# Patient Record
Sex: MALE | Race: WHITE | NOT HISPANIC OR LATINO | Employment: FULL TIME | URBAN - METROPOLITAN AREA
[De-identification: names, ages, dates, MRNs, and addresses within clinical notes are randomized per-mention and may not be internally consistent; named-entity substitution may affect disease eponyms.]

---

## 2019-01-07 ENCOUNTER — HOSPITAL ENCOUNTER (OUTPATIENT)
Dept: GENERAL RADIOLOGY | Facility: HOSPITAL | Age: 29
Discharge: HOME | End: 2019-01-07
Attending: NURSE PRACTITIONER

## 2019-01-16 ENCOUNTER — HOSPITAL ENCOUNTER (OUTPATIENT)
Dept: URGENT CARE | Facility: CLINIC | Age: 29
Discharge: HOME | End: 2019-01-16
Attending: PHYSICIAN ASSISTANT

## 2019-01-19 LAB — BACTERIA SPEC AEROBE CULT: NORMAL

## 2019-05-12 ENCOUNTER — HOSPITAL ENCOUNTER (OUTPATIENT)
Dept: URGENT CARE | Facility: CLINIC | Age: 29
Discharge: HOME | End: 2019-05-12
Attending: FAMILY MEDICINE

## 2019-05-14 LAB — BACTERIA SPEC AEROBE CULT: NORMAL

## 2019-10-24 ENCOUNTER — HOSPITAL ENCOUNTER (OUTPATIENT)
Dept: URGENT CARE | Facility: CLINIC | Age: 29
Discharge: HOME | End: 2019-10-24
Attending: FAMILY MEDICINE

## 2019-10-25 LAB
C TRACH RRNA CVX QL NAA+PROBE: NOT DETECTED
N GONORRHOEA DNA SPEC QL NAA+PROBE: NOT DETECTED

## 2020-01-28 ENCOUNTER — CONVERSION ENCOUNTER (OUTPATIENT)
Dept: GASTROENTEROLOGY | Facility: CLINIC | Age: 30
End: 2020-01-28

## 2020-01-28 ENCOUNTER — OFFICE VISIT CONVERTED (OUTPATIENT)
Dept: GASTROENTEROLOGY | Facility: CLINIC | Age: 30
End: 2020-01-28
Attending: INTERNAL MEDICINE

## 2020-02-14 ENCOUNTER — HOSPITAL ENCOUNTER (OUTPATIENT)
Dept: GASTROENTEROLOGY | Facility: HOSPITAL | Age: 30
Setting detail: HOSPITAL OUTPATIENT SURGERY
Discharge: HOME | End: 2020-02-14
Attending: INTERNAL MEDICINE

## 2020-04-14 ENCOUNTER — TELEPHONE CONVERTED (OUTPATIENT)
Dept: GASTROENTEROLOGY | Facility: CLINIC | Age: 30
End: 2020-04-14
Attending: PHYSICIAN ASSISTANT

## 2020-05-12 ENCOUNTER — HOSPITAL ENCOUNTER (OUTPATIENT)
Dept: URGENT CARE | Facility: CLINIC | Age: 30
Discharge: HOME | End: 2020-05-12

## 2020-05-12 LAB
C TRACH RRNA CVX QL NAA+PROBE: NOT DETECTED
N GONORRHOEA DNA SPEC QL NAA+PROBE: NOT DETECTED

## 2020-09-21 ENCOUNTER — OFFICE VISIT CONVERTED (OUTPATIENT)
Dept: GASTROENTEROLOGY | Facility: CLINIC | Age: 30
End: 2020-09-21
Attending: NURSE PRACTITIONER

## 2020-11-12 ENCOUNTER — OFFICE VISIT CONVERTED (OUTPATIENT)
Dept: FAMILY MEDICINE CLINIC | Facility: CLINIC | Age: 30
End: 2020-11-12
Attending: PHYSICIAN ASSISTANT

## 2020-11-12 ENCOUNTER — CONVERSION ENCOUNTER (OUTPATIENT)
Dept: FAMILY MEDICINE CLINIC | Facility: CLINIC | Age: 30
End: 2020-11-12

## 2020-12-11 ENCOUNTER — HOSPITAL ENCOUNTER (OUTPATIENT)
Dept: URGENT CARE | Facility: CLINIC | Age: 30
Discharge: HOME | End: 2020-12-11
Attending: NURSE PRACTITIONER

## 2020-12-17 ENCOUNTER — OFFICE VISIT CONVERTED (OUTPATIENT)
Dept: FAMILY MEDICINE CLINIC | Facility: CLINIC | Age: 30
End: 2020-12-17
Attending: PHYSICIAN ASSISTANT

## 2020-12-18 ENCOUNTER — HOSPITAL ENCOUNTER (OUTPATIENT)
Dept: LAB | Facility: HOSPITAL | Age: 30
Discharge: HOME | End: 2020-12-18
Attending: PHYSICIAN ASSISTANT

## 2020-12-21 LAB — H PYLORI IGM SER-ACNC: <9 UNITS (ref 0–8.9)

## 2020-12-22 ENCOUNTER — HOSPITAL ENCOUNTER (OUTPATIENT)
Dept: GENERAL RADIOLOGY | Facility: HOSPITAL | Age: 30
Discharge: HOME | End: 2020-12-22
Attending: PHYSICIAN ASSISTANT

## 2020-12-23 ENCOUNTER — HOSPITAL ENCOUNTER (OUTPATIENT)
Dept: SURGERY | Facility: CLINIC | Age: 30
Discharge: HOME | End: 2020-12-23
Attending: NURSE PRACTITIONER

## 2020-12-23 ENCOUNTER — OFFICE VISIT CONVERTED (OUTPATIENT)
Dept: SURGERY | Facility: CLINIC | Age: 30
End: 2020-12-23
Attending: NURSE PRACTITIONER

## 2020-12-23 ENCOUNTER — CONVERSION ENCOUNTER (OUTPATIENT)
Dept: SURGERY | Facility: CLINIC | Age: 30
End: 2020-12-23

## 2020-12-23 LAB
APPEARANCE UR: CLEAR
BILIRUB UR QL: NEGATIVE
COLOR UR: YELLOW
CONV COLLECTION SOURCE (UA): NORMAL
CONV UROBILINOGEN IN URINE BY AUTOMATED TEST STRIP: 1 {EHRLICHU}/DL (ref 0.1–1)
GLUCOSE UR QL: NEGATIVE MG/DL
HGB UR QL STRIP: NEGATIVE
KETONES UR QL STRIP: NEGATIVE MG/DL
LEUKOCYTE ESTERASE UR QL STRIP: NEGATIVE
NITRITE UR QL STRIP: NEGATIVE
PH UR STRIP.AUTO: 6 [PH] (ref 5–8)
PROT UR QL: NEGATIVE MG/DL
SP GR UR: 1.03 (ref 1–1.03)

## 2020-12-25 LAB — BACTERIA UR CULT: NORMAL

## 2021-01-05 ENCOUNTER — OFFICE VISIT CONVERTED (OUTPATIENT)
Dept: GASTROENTEROLOGY | Facility: CLINIC | Age: 31
End: 2021-01-05
Attending: NURSE PRACTITIONER

## 2021-01-05 ENCOUNTER — CONVERSION ENCOUNTER (OUTPATIENT)
Dept: GASTROENTEROLOGY | Facility: CLINIC | Age: 31
End: 2021-01-05

## 2021-01-13 ENCOUNTER — OFFICE VISIT CONVERTED (OUTPATIENT)
Dept: SURGERY | Facility: CLINIC | Age: 31
End: 2021-01-13
Attending: NURSE PRACTITIONER

## 2021-01-21 ENCOUNTER — OFFICE VISIT CONVERTED (OUTPATIENT)
Dept: FAMILY MEDICINE CLINIC | Facility: CLINIC | Age: 31
End: 2021-01-21
Attending: PHYSICIAN ASSISTANT

## 2021-01-21 ENCOUNTER — HOSPITAL ENCOUNTER (OUTPATIENT)
Dept: LAB | Facility: HOSPITAL | Age: 31
Discharge: HOME | End: 2021-01-21
Attending: PHYSICIAN ASSISTANT

## 2021-01-21 LAB — ERYTHROCYTE [SEDIMENTATION RATE] IN BLOOD: 2 MM/H (ref 0–20)

## 2021-01-22 LAB
ASO AB SERPL-ACNC: 87 [IU]/ML (ref 0–200)
CONV RHEUMATOID FACTOR IGM: <10 [IU]/ML (ref 0–14)
CRP SERPL-MCNC: <3 MG/L (ref 0–5)
DSDNA AB SER-ACNC: NEGATIVE [IU]/ML
ENA AB SER IA-ACNC: NEGATIVE {RATIO}
URATE SERPL-MCNC: 5.4 MG/DL (ref 3.5–8.5)

## 2021-02-15 ENCOUNTER — OFFICE VISIT CONVERTED (OUTPATIENT)
Dept: UROLOGY | Facility: CLINIC | Age: 31
End: 2021-02-15
Attending: UROLOGY

## 2021-02-15 LAB
BILIRUB UR QL STRIP: NEGATIVE
COLOR UR: YELLOW
CONV CLARITY OF URINE: CLEAR
CONV PROTEIN IN URINE BY AUTOMATED TEST STRIP: NEGATIVE
CONV UROBILINOGEN IN URINE BY AUTOMATED TEST STRIP: NORMAL
GLUCOSE UR QL: NEGATIVE
HGB UR QL STRIP: NEGATIVE
KETONES UR QL STRIP: NEGATIVE
LEUKOCYTE ESTERASE UR QL STRIP: NEGATIVE
NITRITE UR QL STRIP: NEGATIVE
PH UR STRIP.AUTO: 5.5 [PH]
SP GR UR: NORMAL

## 2021-03-24 ENCOUNTER — OFFICE VISIT CONVERTED (OUTPATIENT)
Dept: FAMILY MEDICINE CLINIC | Facility: CLINIC | Age: 31
End: 2021-03-24
Attending: PHYSICIAN ASSISTANT

## 2021-05-04 ENCOUNTER — CONVERSION ENCOUNTER (OUTPATIENT)
Dept: GASTROENTEROLOGY | Facility: CLINIC | Age: 31
End: 2021-05-04

## 2021-05-10 NOTE — H&P
History and Physical      Patient Name: Ian Teixeira   Patient ID: [id number]   Sex: Male   YOB: 1990    Primary Care Provider: Monica TANG   Referring Provider: Monica TANG    Visit Date: December 23, 2020    Provider: DEZ [facility]   Location: [facility]   Location Address: [address] [place] [address]  [id number]   Location Phone: [phone]          Chief Complaint     Abdominal pain  Dysuria  painful ejaculation       History Of Present Illness     Patient presents today on referral from Monica Ramsay for dysuria and painful ejaculation. Reports dysuria has been present for 1 year. Patient has a history of multiple STD's. States his painful ejaculation is not with every orgasm but at least 2/month.  Patient reports that he was seen in [facility] for painful urination and painful ejaculation.  Patient reports they did some labs and said everything was good but stated he needed to follow-up with urology.  Urinalysis was negative at that visit.    Patient did have a CT scan on 12/22/2020 that was negative for  issues.  It did reveal some shaggy irregular colonic wall thickening involving the descending and transverse colon.  Patiently underwent a recent colonoscopy with Dr. Boothe and was diagnosed with IBS.    Today patient denies any urinary frequency or urgency.  Admits to dysuria.  Denies any nocturia.  Denies any abdominal or flank pain.  Denies any change in his urine color.  Denies any trouble voiding.  Admits to emptying his bladder well.  Denies any scrotum pain.           Past Medical History  Disease Name Date Onset Notes   Allergic rhinitis --  --    Anxiety --  --    GERD (gastroesophageal reflux disease) --  --          Past Surgical History  Procedure Name Date Notes   Colonoscopy 2020 --    EGD 2020 --    Spleen Removal --  --          Medication List  Name Date Started Instructions   buspirone 10 mg oral tablet  take 1  "tablet by oral route daily   Claritin 10 mg oral tablet 11/12/2020 take 1 tablet (10 mg) by oral route once daily for 30 days   omeprazole 40 mg oral capsule,delayed release(DR/EC) 09/21/2020 take 1 capsule (40 mg) by oral route once daily before a meal for 90 days   Singulair 10 mg oral tablet 11/12/2020 take 1 tablet (10 mg) by oral route once daily in the evening for 30 days         Allergy List  Allergen Name Date Reaction Notes   NO KNOWN DRUG ALLERGIES --  --  --        Allergies Reconciled  Family Medical History  Disease Name Relative/Age Notes   - No Family History of Colorectal Cancer  --          Social History  Finding Status Start/Stop Quantity Notes   Alcohol Current some day --/-- --  drinks weekly, 2 drinks per day, has been drinking for 6-10 years    --  --/-- --  --    Tobacco Former --/-- --  former smoker, smoked 6-10 years         Review of Systems  · Constitutional  o Denies  o : fatigue, fever, chills, night sweats  · Genitourinary  o Admits  o : dysuria  o Denies  o : urgency, frequency, nocturia, hematuria, oliguria, change in urine color, incontinence, urinary retention, difficulty voiding, urinary hesitancy, decreased stream, post-void dribbling, decreased libido, scrotal pain, penile discharge      Vitals  Date Time BP Position Site L\R Cuff Size HR RR TEMP (F) WT  HT  BMI kg/m2 BSA m2 O2 Sat FR L/min FiO2 HC       12/23/2020 10:05 AM       14  165lbs 0oz 5'  4\" 28.32 1.84                 Results  · In-Office Procedures  o Lab procedure  § Automated Dipstick Urinalysis (Surg Spec) WITHOUT Micro [facility] (03662)   § Color Ur: Yellow   § Clarity Ur: Clear   § Glucose Ur Ql Strip: Negative   § Bilirub Ur Ql Strip: Small   § Ketones Ur Ql Strip: Trace   § Sp Gr Ur Qn: greater than 1.030   § Hgb Ur Ql Strip: Negative   § pH Ur-LsCnc: 6.0   § Prot Ur Ql Strip: Negative   § Urobilinogen Ur Strip-mCnc: 1.0 E.U./dL   § Nitrite Ur Ql Strip: Negative   § WBC Est Ur Ql Strip: Negative "       Assessment  · Dysuria     788.1/R30.0  · Painful ejaculation     608.89/N53.12    Problems Reconciled  Plan  · Orders  o Urinalysis with Reflex Microscopy (43576) - 788.1/R30.0, 608.89/N53.12 - 12/23/2020  o Urine Culture (Clean Catch) [facility] (12051) - 788.1/R30.0, 608.89/N53.12 - 12/23/2020  · Medications  o Medications have been Reconciled  o Transition of Care or Provider Policy  · Instructions  o Follow-up with Dr. Michi Le in 1 month. If painful ejaculation persist make appt with Dr. Le before then.   o Will call with results of urine culture.   o Electronically Identified Patient Education Materials Provided Electronically  · Disposition  o Call or Return if symptoms worsen or persist.  · Referrals  o ID: [id number] Date: 12/21/2020 Type: Inbound  Specialty: Urology            Electronically Signed by: DEZ [facility] -Author on December 23, 2020 02:43:47 PM

## 2021-05-10 NOTE — H&P
History and Physical      Patient Name: Ian Teixeira   Patient ID: [id number]   Sex: Male   YOB: 1990    Primary Care Provider: Monica TANG   Referring Provider: Piper GARCES    Visit Date: November 12, 2020    Provider: CLYDE Cherry   Location: [facility]   Location Address: [address] [place] [address], [address]  [place], [place]  [id number]   Location Phone: [phone]          Chief Complaint  · New Patient-Establish Care       History Of Present Illness  Ian Teixeira is a 30 year old /White male who presents for evaluation and treatment of:      New patient to establish care, previous patient at [facility]     Anxiety: Patient is taking Buspirone 7.5 mg in am and 10mg at pm and states he feels like it works ok from time to time. He has been taking it a few months. He states it gives him a weird feeling when he first takes it. States that he worries about things of the future and things that he doen't have control of; sx about 5 years. /marriage issues that started in 2016. No SI/HI.    GERD: He currently takes Omeprazole with great results. He is followed by Dr. Boothe.    AR: He currently taking Loratidine but states it does not help. He started allergy injections about 1 year ago and is followed by [facility].    Last labs: about 1 mth ago; request records.       Past Medical History  Disease Name Date Onset Notes   Allergies --  --    Anemia --  --    Anxiety --  --    Chronic sinusitis --  --    GERD (gastroesophageal reflux disease) --  --    Headache disorder --  --    Hypertension --  --    Tic disorder, unspecified --  --          Past Surgical History  Procedure Name Date Notes   Colonoscopy 2020 --    EGD 2020 --    Spleen Removal --  --          Medication List  Name Date Started Instructions   Acidophilus oral capsule  take 1 capsule by oral route daily   Carafate 1 gram oral tablet  take  1 tablet by oral route 3 times a day   Claritin 10 mg oral tablet  take 1 tablet (10 mg) by oral route once daily   hydroxyzine HCl 10 mg oral tablet  take 1 tablet by oral route daily   omeprazole 40 mg oral capsule,delayed release(DR/EC) 09/21/2020 take 1 capsule (40 mg) by oral route once daily before a meal for 90 days         Allergy List  Allergen Name Date Reaction Notes   NO KNOWN DRUG ALLERGIES --  --  --        Allergies Reconciled  Family Medical History  Disease Name Relative/Age Notes   - No Family History of Colorectal Cancer  --          Social History  Finding Status Start/Stop Quantity Notes   Alcohol Current some day --/-- --  drinks weekly, 2 drinks per day, has been drinking for 6-10 years   lives with children --  --/-- --  --    lives with spouse --  --/-- --  --     --  --/-- --  --    Tobacco Former --/-- --  former smoker, smoked 6-10 years   Working --  --/-- --  --          Review of Systems  · Constitutional  o Denies  o : fatigue, night sweats  · Eyes  o Denies  o : double vision, blurred vision  · HENT  o Admits  o : nasal discharge, postnasal drip  o Denies  o : vertigo, recent head injury  · Breasts  o Denies  o : abnormal changes in breast size, additional breast symptoms except as noted in the HPI  · Cardiovascular  o Denies  o : chest pain, irregular heart beats  · Respiratory  o Denies  o : shortness of breath, productive cough  · Gastrointestinal  o Denies  o : nausea, vomiting  · Genitourinary  o Denies  o : dysuria, urinary retention  · Integument  o Denies  o : hair growth change, new skin lesions  · Neurologic  o Denies  o : altered mental status, seizures  · Musculoskeletal  o Denies  o : joint swelling, limitation of motion  · Endocrine  o Denies  o : cold intolerance, heat intolerance  · Psychiatric  o Admits  o : anxiety  · Heme-Lymph  o Denies  o : petechiae, lymph node enlargement or tenderness  · Allergic-Immunologic  o Denies  o : frequent  "illnesses      Vitals  Date Time BP Position Site L\R Cuff Size HR RR TEMP (F) WT  HT  BMI kg/m2 BSA m2 O2 Sat FR L/min FiO2 HC       11/12/2020 09:14 /76 Sitting    64 - R  97.9 168lbs 2oz 5'  4\" 28.86 1.86 99 %  21%          Physical Examination  · Constitutional  o Appearance  o : well developed, well-nourished, no acute distress  · Head and Face  o Head  o : normocephalic, atraumatic  · Ears, Nose, Mouth and Throat  o Ears  o :   § External Ears  § : external auditory canal appearance normal, no discharge present  § Otoscopic Examination  § : tympanic membranes pearly white/gray bilaterally  o Nose  o :   § External Nose  § : no lesions noted  § Nasopharynx  § : no discharge present  o Oral Cavity  o :   § Oral Mucosa  § : oral mucosa light pink  o Throat  o :   § Oropharynx  § : tonsils without exudate, no palatal petechiae  · Neck  o Inspection/Palpation  o : normal appearance, no masses or tenderness, trachea midline  o Thyroid  o : gland size normal, nontender, no nodules or masses present on palpation  · Respiratory  o Respiratory Effort  o : breathing unlabored  o Inspection of Chest  o : chest rise symmetric bilaterally  o Auscultation of Lungs  o : clear to auscultation bilaterally throughout inspiration and expiration  · Cardiovascular  o Heart  o :   § Auscultation of Heart  § : regular rate and rhythm, no murmurs, gallops or rubs  o Peripheral Vascular System  o :   § Extremities  § : no edema  · Lymphatic  o Neck  o : no cervical lymphadenopathy, no supraclavicular lymphadenopathy  · Psychiatric  o Mood and Affect  o : mood normal, affect appropriate          Assessment  · Screening for depression     V79.0/Z13.89  · Allergic rhinitis due to allergen     477.9/J30.9  Add Singulair 10mg qd to current regimen  · Anxiety disorder     300.00/F41.9  Continue on current regimen now; will request records to see what pt was previously on as to not replicate a medication that didn't work. Gave handout " and encouraged counseling.  · GERD (gastroesophageal reflux disease)     530.81/K21.9  Continue current regimen  · Establishing care with new doctor, encounter for     V65.8/Z76.89    Problems Reconciled  Plan  · Orders  o ACO-39: Current medications updated and reviewed (1159F, ) - - 11/12/2020  o ACO-18: Negative screen for clinical depression using a standardized tool () - - 11/12/2020  o ACO-14: Influenza immunization administered or previously received [facility] () - - 11/12/2020  · Medications  o Singulair 10 mg oral tablet   SIG: take 1 tablet (10 mg) by oral route once daily in the evening for 30 days   DISP: (30) Tablet with 2 refills  Prescribed on 11/12/2020     o Claritin 10 mg oral tablet   SIG: take 1 tablet (10 mg) by oral route once daily for 30 days   DISP: (30) Tablet with 5 refills  Prescribed on 11/12/2020     o Medications have been Reconciled  o Transition of Care or Provider Policy  · Instructions  o Depression Screen completed and scanned into the EMR under the designated folder within the patient's documents.  o Today's PHQ-9 result is 3  o Maintain a healthy weight. Avoid tight fitting clothes. Avoid fried, fatty foods, tomato sauce, chocolate, mint, garlic, onion, alcohol. caffeine. Eat smaller meals, dont lie down after a meal, dont smoke. Elevate the head of your bed 6-9 inches.  o Take all medications as prescribed/directed.  o Patient was educated/instructed on their diagnosis, treatment and medications prior to discharge from the clinic today.  o Call the office with any concerns or questions.  o Discussed Covid-19 precautions including, but not limited to, social distancing, avoid touching your face, and hand washing.   o Electronically Identified Patient Education Materials Provided Electronically  · Disposition  o Follow Up 1 month.            Electronically Signed by: CLYDE Cherry -Author on November 12, 2020 10:18:21 AM

## 2021-05-10 NOTE — H&P
History and Physical      Patient Name: Ian Teixeira   Patient ID: [id number]   Sex: Male   YOB: 1990    Primary Care Provider: Monica TANG   Referring Provider: Monica TANG    Visit Date: January 13, 2021    Provider: DEZ [facility]   Location: [facility]   Location Address: [address] [place] [address]  [id number]   Location Phone: [phone]          Chief Complaint  · Follow Up      History Of Present Illness  Ian Teixeira is a 30 year old /White male who is here to follow up exam.      Patient presents today for follow-up visit with complaints of burning on urination.  On previous visit patient was seen for dysuria and painful ejaculation.  He reports today that he is not sexually active and so unsure if he still has painful ejaculations.  Reports that overall urinary issues have improved was with some mild dysuria this morning.  Urinalysis and urine culture were negative upon last visit.  He is currently taking Cipro and Flagyl for some colitis issues.  Denies any trouble voiding.  Denies any gross hematuria.       Past Medical History  Disease Name Date Onset Notes   Allergic rhinitis --  --    Anxiety --  --    GERD (gastroesophageal reflux disease) --  --          Past Surgical History  Procedure Name Date Notes   Colonoscopy 2020 --    EGD 2020 --    Spleen Removal --  --          Medication List  Name Date Started Instructions   buspirone 10 mg oral tablet  take 1 tablet by oral route daily   Claritin 10 mg oral tablet 11/12/2020 take 1 tablet (10 mg) by oral route once daily for 30 days   omeprazole 40 mg oral capsule,delayed release(DR/EC) 09/21/2020 take 1 capsule (40 mg) by oral route once daily before a meal for 90 days   Singulair 10 mg oral tablet 11/12/2020 take 1 tablet (10 mg) by oral route once daily in the evening for 30 days         Allergy List  Allergen Name Date Reaction Notes   NO KNOWN DRUG ALLERGIES --  --   "--        Allergies Reconciled  Family Medical History  Disease Name Relative/Age Notes   - No Family History of Colorectal Cancer  --          Social History  Finding Status Start/Stop Quantity Notes   Alcohol Current some day --/-- --  drinks weekly, 2 drinks per day, has been drinking for 6-10 years    --  --/-- --  --    Tobacco Former --/-- --  former smoker, smoked 6-10 years         Review of Systems  · Constitutional  o Denies  o : chills, fever  · Eyes  o Denies  o : yellowish discoloration of eyes  · HENT  o Denies  o : difficulty swallowing  · Cardiovascular  o Denies  o : chest pain on exertion  · Respiratory  o Denies  o : shortness of breath  · Gastrointestinal  o Denies  o : nausea, vomiting, diarrhea, constipation  · Genitourinary  o Admits  o : dysuria  o Denies  o : urgency, frequency, nocturia, hematuria, oliguria, change in urine color, incontinence, urinary retention, difficulty voiding, urinary hesitancy, abnormal color of urine  · Integument  o Denies  o : rash  · Neurologic  o Denies  o : tingling or numbness  · Musculoskeletal  o Denies  o : joint pain  · Endocrine  o Denies  o : weight gain, weight loss      Vitals  Date Time BP Position Site L\R Cuff Size HR RR TEMP (F) WT  HT  BMI kg/m2 BSA m2 O2 Sat FR L/min FiO2 HC       01/13/2021 01:49 PM       12  163lbs 0oz 5'  4\" 27.98 1.83             Physical Examination  · Constitutional  o Appearance  o : well developed, well-nourished, patient in no apparent distress  · Head and Face  o Head  o :   § Inspection  § : atraumatic, normocephalic  o Face  o :   § Inspection  § : no facial lesions  · Eyes  o Conjunctivae  o : conjunctivae normal  o Sclerae  o : sclerae white  · Neck  o Inspection/Palpation  o : normal appearance, no masses or tenderness, trachea midline  · Respiratory  o Respiratory Effort  o : breathing unlabored  · Skin and Subcutaneous Tissue  o General Inspection  o : no lesions present, no areas of discoloration, skin " turgor normal, texture normal  · Neurologic  o Mental Status Examination  o :   § Orientation  § : grossly oriented to person, place and time  § Attention  § : attention normal, concentration abilities normal  § Fund of Knowledge  § : fund of knowledge within normal limits, patient aware of current events  o Gait and Station  o : normal gait, able to stand without difficulty  · Psychiatric  o Judgement and Insight  o : judgment and insight intact  o Mood and Affect  o : mood normal, affect appropriate          Results  · In-Office Procedures  o Lab procedure  § Automated Dipstick Urinalysis (Surg Spec) WITHOUT Micro [facility] (33144)   § Color Ur: Yellow   § Clarity Ur: Clear   § Glucose Ur Ql Strip: Negative   § Bilirub Ur Ql Strip: Negative   § Ketones Ur Ql Strip: Negative   § Sp Gr Ur Qn: greater than 1.030   § Hgb Ur Ql Strip: Negative   § pH Ur-LsCnc: 6.0   § Prot Ur Ql Strip: Negative   § Urobilinogen Ur Strip-mCnc: 0.2 E.U./dL   § Nitrite Ur Ql Strip: Negative   § WBC Est Ur Ql Strip: Trace       Assessment  · Dysuria     788.1/R30.0    Problems Reconciled  Plan  · Medications  o Medications have been Reconciled  o Transition of Care or Provider Policy  · Instructions  o Follow-up with Dr. Le for next available appointment.  o Electronically Identified Patient Education Materials Provided Electronically  · Disposition  o Call or Return if symptoms worsen or persist.            Electronically Signed by: DEZ [facility] -Author on January 13, 2021 02:34:55 PM

## 2021-05-12 NOTE — PROGRESS NOTES
Quick Note      Patient Name: Ian Teixeira   Patient ID: [id number]   Sex: Male   YOB: 1990    Primary Care Provider: Piper GARCES    Visit Date: April 14, 2020    Provider: Josafat Escamilla PA-C   Location: [facility]   Location Address: [address] [place] [address]  [id number]   Location Phone: [phone]          History Of Present Illness  TELEHEALTH VISIT  Chief Complaint: Follow up EGD/colonoscopy   Ian Teixeira is a 30 year old /White male who is presenting for evaluation via telehealth visit. Verbal consent obtained before beginning visit.   Provider spent 11 minutes with the patient during telehealth visit via telephone.   The following staff were present during this visit: Latonia Hagan MA   Past Medical History/Overview of Patient Symptoms     30-year-old male agrees to telehealth/telephone visit for his colonoscopy/EGD results.  He underwent the procedure for rectal bleeding, GERD, generalized abdominal pain.  Review of the colonoscopy/EGD by Dr. Boothe 02/2020 showed small hiatal hernia, Vera's esophagus without dysplasia, gastritis, and internal hemorrhoids.  His omeprazole was increased from 20 mg to 40 mg he notes significant provement gastroesophageal reflux symptoms.  His rectal bleeding has improved, but he is still having abdominal cramping.           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  o Patient instructed to seek medical attention urgently for new or worsening symptoms.  o Patient was  educated/instructed on their diagnosis, treatment and medications prior to discharge from the clinic today.  o 30-year-old male with Vera's esophagus, GERD, and meets the Martínez IV criteria for irritable bowel syndrome. I will add a trial of Bentyl 20 mg up to 3 times daily as needed for abdominal cramping. Emphasize importance of taking omeprazole 40 mg daily for his Vera's esophagus. I will add him to the 3-year EGD recall for Vera's esophagus surveillance. He will follow-up in 3 months.            Electronically Signed by: CLYDE Rabago-MASON -Author on April 14, 2020 08:27:42 AM  Electronically Co-signed by: Donald Boothe MD -Reviewer on April 14, 2020 04:13:12 PM

## 2021-05-13 NOTE — PROGRESS NOTES
Progress Note      Patient Name: Ian Teixeira   Patient ID: [id number]   Sex: Male   YOB: 1990    Primary Care Provider: Piper GARCES   Referring Provider: Piper GARCES    Visit Date: September 21, 2020    Provider: DEZ Villalta   Location[facility]   Location Address: [address] [place] [address]  [place]  [id number]   Location Phone: [phone]          Chief Complaint  · GERD  · Vera's Esophagus  · IBS      History Of Present Illness     30-year-old male with a history of GERD, Vera's esophagus, and IBS returns for follow-up.  At last office visit, he was given a trial of Bentyl 20 mg 3 times daily as needed for abdominal cramping.  He reports that dicyclomine did help improve his abdominal cramping, but it made him extremely drowsy, so he stopped taking it.  He reports he does have occasional abdominal pain.  He reports he has 1 regular formed bowel movement a day.  He reports that omeprazole 40 mg is controlling his reflux.  He reports he is taking Carafate when he remembers to take the medication.  Review of his EGD/colonoscopy by Dr. Boothe 02/2020 revealed small hiatal hernia, Vera's esophagus without dysplasia, gastritis, and internal hemorrhoids.       Past Medical History  Allergies; Anemia; Anxiety; Chronic sinusitis; GERD (gastroesophageal reflux disease); Headache disorder; Hypertension; Tic disorder, unspecified         Past Surgical History  Colonoscopy; EGD; Spleen Removal         Medication List  Acidophilus oral capsule; Carafate 1 gram oral tablet; Claritin 10 mg oral tablet; hydroxyzine HCl 10 mg oral tablet; omeprazole 40 mg oral capsule,delayed release(/EC)         Allergy List  NO KNOWN DRUG ALLERGIES       Allergies Reconciled  Family Medical History  - No Family History of Colorectal Cancer         Social History  Alcohol (Current some day); lives with children; lives with spouse; ; Tobacco (Former);  Working         Review of Systems  · Constitutional  o Denies  o : chills, fever  · Cardiovascular  o Denies  o : chest pain  · Respiratory  o Denies  o : shortness of breath  · Gastrointestinal  o Denies  o : nausea, vomiting, dysphagia  · Endocrine  o Denies  o : weight gain, weight loss      Vitals  Date Time BP Position Site L\R Cuff Size HR RR TEMP (F) WT  HT  BMI kg/m2 BSA m2 O2 Sat HC       09/21/2020 08:35 /69 Sitting    57 - R 16 98.6 168lbs 0oz    99 %          Physical Examination  · Constitutional  o Appearance  o : Healthy-appearing, awake and alert in no acute distress  · Head and Face  o Head  o : Normocephalic with no worriesome skin lesions  · Eyes  o Vision  o :   § Visual Fields  § : eyes move symmetrical in all directions  o Sclerae  o : sclerae anicteric  · Neck  o Inspection/Palpation  o : Trachea is midline, no adenopathy  · Respiratory  o Respiratory Effort  o : Breathing is unlabored.  o Inspection of Chest  o : normal appearance  o Auscultation of Lungs  o : Chest is clear to auscultation bilaterally.  · Cardiovascular  o Heart  o :   § Auscultation of Heart  § : bradycardia present, normal rhythm, no murmurs present, no pericardial friction rub  o Peripheral Vascular System  o :   § Extremities  § : no cyanosis, clubbing or edema;   · Gastrointestinal  o Abdominal Examination  o : Abdomen is soft, nontender to palpation, with normal active bowel sounds, no appreciable hepatosplenomegaly.          Assessment  · Vera's esophagus     530.85  · Gastroesophageal Reflux     530.81/K21.9  · Irritable Bowel Syndrome     564.1/K58.9      Plan  · Medications  o omeprazole 40 mg oral capsule,delayed release(DR/EC)   SIG: take 1 capsule (40 mg) by oral route once daily before a meal for 90 days   DISP: (90) capsule with 3 refills  Prescribed on 09/21/2020     o Medications have been Reconciled  o Transition of Care or Provider Policy  · Instructions  o 30-year-old male with a history of GERD,  Vera's esophagus, and IBS returns for follow-up. He was given a trial of Bentyl last office visit, but he had to stop taking it due to making him drowsy. I have recommended that he only take Bentyl right before bedtime if he is having abdominal pain. His reflux is being well controlled with omeprazole 40 mg, so I recommended that he stop taking Carafate. I am going to have him follow-up on an annual basis or sooner for any concerns.            Electronically Signed by: DEZ Villalta -Author on September 21, 2020 08:56:33 AM  Electronically Co-signed by: Donald Boothe MD -Reviewer on September 28, 2020 02:43:33 PM

## 2021-05-14 VITALS
DIASTOLIC BLOOD PRESSURE: 77 MMHG | OXYGEN SATURATION: 99 % | HEART RATE: 70 BPM | TEMPERATURE: 97.8 F | WEIGHT: 165.5 LBS | HEIGHT: 64 IN | SYSTOLIC BLOOD PRESSURE: 121 MMHG | BODY MASS INDEX: 28.25 KG/M2

## 2021-05-14 VITALS
DIASTOLIC BLOOD PRESSURE: 65 MMHG | TEMPERATURE: 98.5 F | HEART RATE: 64 BPM | WEIGHT: 164 LBS | SYSTOLIC BLOOD PRESSURE: 131 MMHG | BODY MASS INDEX: 28 KG/M2 | HEIGHT: 64 IN | OXYGEN SATURATION: 99 %

## 2021-05-14 VITALS
HEART RATE: 57 BPM | WEIGHT: 168 LBS | DIASTOLIC BLOOD PRESSURE: 69 MMHG | TEMPERATURE: 98.6 F | SYSTOLIC BLOOD PRESSURE: 136 MMHG | OXYGEN SATURATION: 99 % | RESPIRATION RATE: 16 BRPM

## 2021-05-14 VITALS
OXYGEN SATURATION: 99 % | SYSTOLIC BLOOD PRESSURE: 135 MMHG | BODY MASS INDEX: 28.7 KG/M2 | HEART RATE: 64 BPM | TEMPERATURE: 97.9 F | DIASTOLIC BLOOD PRESSURE: 76 MMHG | WEIGHT: 168.12 LBS | HEIGHT: 64 IN

## 2021-05-14 VITALS
HEART RATE: 71 BPM | TEMPERATURE: 97.9 F | OXYGEN SATURATION: 100 % | WEIGHT: 161.5 LBS | SYSTOLIC BLOOD PRESSURE: 121 MMHG | BODY MASS INDEX: 27.57 KG/M2 | DIASTOLIC BLOOD PRESSURE: 72 MMHG | HEIGHT: 64 IN

## 2021-05-14 VITALS — WEIGHT: 163 LBS | RESPIRATION RATE: 12 BRPM | HEIGHT: 64 IN | BODY MASS INDEX: 27.83 KG/M2

## 2021-05-14 VITALS — RESPIRATION RATE: 14 BRPM | WEIGHT: 165 LBS | HEIGHT: 64 IN | BODY MASS INDEX: 28.17 KG/M2

## 2021-05-14 VITALS
HEART RATE: 74 BPM | RESPIRATION RATE: 16 BRPM | DIASTOLIC BLOOD PRESSURE: 78 MMHG | BODY MASS INDEX: 27.49 KG/M2 | SYSTOLIC BLOOD PRESSURE: 126 MMHG | WEIGHT: 161 LBS | HEIGHT: 64 IN | OXYGEN SATURATION: 100 %

## 2021-05-14 VITALS — HEIGHT: 64 IN | RESPIRATION RATE: 12 BRPM | BODY MASS INDEX: 27.87 KG/M2 | WEIGHT: 163.25 LBS
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
## 2021-05-14 NOTE — PROGRESS NOTES
Progress Note      Patient Name: Ian Teixeira   Patient ID: [id number]   Sex: Male   YOB: 1990    Primary Care Provider: Monica TANG   Referring Provider: Monica TANG    Visit Date: March 24, 2021    Provider: CLYDE Cherry   Location: [facility]   Location Address: [address] [place] [address], [address]  [place], [place]  [id number]   Location Phone: [phone]          Chief Complaint  · follow up       History Of Present Illness  Ian Teixeira is a 31 year old /White male who presents for evaluation and treatment of:      Patient is here today for follow up     Anxiety: He is taking Zoloft 25mg qd and states for the most part it works well but it may need to be increased. He states his energy level is hit or miss and his sleep is horrible, tosses and turns, wakes throughout the night. Patient does state that he wife says he snores; does note some fatigue.     GERD: He is taking Omeprazole with good results; has Vera's esophagus; last EGD in 2020; followed by Dr. Boothe.    AR: He is taking Singulair and Claritin but states lately they have not been working well. He is taking allergy injections as well and states he will talk with his allergist about it.       Past Medical History  Disease Name Date Onset Notes   Allergic rhinitis --  --    Anxiety --  --    Vera's esophagus 01/21/2021 --    GERD (gastroesophageal reflux disease) --  --          Past Surgical History  Procedure Name Date Notes   Colonoscopy 2020 --    EGD 2020 --    Spleen Removal --  --          Medication List  Name Date Started Instructions   Claritin 10 mg oral tablet 03/04/2021 take 1 tablet (10 mg) by oral route once daily for 30 days   omeprazole 40 mg oral capsule,delayed release(/EC) 03/04/2021 take 1 capsule (40 mg) by oral route once daily before a meal for 30 days   Singulair 10 mg oral tablet 03/04/2021 take 1 tablet (10 mg) by oral route once daily in the  evening for 30 days   Zoloft 50 mg oral tablet 03/24/2021 take 1 tablet (50 mg) by oral route once daily for 30 days         Allergy List  Allergen Name Date Reaction Notes   NO KNOWN DRUG ALLERGIES --  --  --        Allergies Reconciled  Family Medical History  Disease Name Relative/Age Notes   - No Family History of Colorectal Cancer  --          Social History  Finding Status Start/Stop Quantity Notes   Alcohol Current some day --/-- --  drinks weekly, 2 drinks per day, has been drinking for 6-10 years    --  --/-- --  --    Tobacco Former --/-- --  former smoker, smoked 6-10 years         Immunizations  NameDate Admin Mfg Trade Name Lot Number Route Inj VIS Given VIS Publication   [id number]/2020 SK[employer]  NE NE     Comments: received @ [facility]         Review of Systems  · Constitutional  o Admits  o : fatigue  o Denies  o : night sweats  · Eyes  o Denies  o : double vision, blurred vision  · HENT  o Admits  o : postnasal drip, snoring  o Denies  o : vertigo, recent head injury  · Breasts  o Denies  o : abnormal changes in breast size, additional breast symptoms except as noted in the HPI  · Cardiovascular  o Denies  o : chest pain, irregular heart beats  · Respiratory  o Denies  o : shortness of breath, productive cough  · Gastrointestinal  o Denies  o : nausea, vomiting  · Genitourinary  o Denies  o : dysuria, urinary retention  · Integument  o Denies  o : hair growth change, new skin lesions  · Neurologic  o Denies  o : altered mental status, seizures  · Musculoskeletal  o Denies  o : joint swelling, limitation of motion  · Endocrine  o Denies  o : cold intolerance, heat intolerance  · Psychiatric  o Admits  o : anxiety, difficulty sleeping  · Heme-Lymph  o Denies  o : petechiae, lymph node enlargement or tenderness  · Allergic-Immunologic  o Denies  o : frequent illnesses      Vitals  Date Time BP Position Site L\R Cuff Size HR RR TEMP (F) WT  HT  BMI kg/m2 BSA m2 O2 Sat FR L/min  "FiO2 HC       03/24/2021 08:35 /65 Sitting    64 - R  98.5 164lbs 0oz 5'  4\" 28.15 1.83 99 %  21%          Physical Examination  · Constitutional  o Appearance  o : well developed, well-nourished, no acute distress  · Head and Face  o Head  o : normocephalic, atraumatic  · Neck  o Inspection/Palpation  o : normal appearance, no masses or tenderness, trachea midline  o Thyroid  o : gland size normal, nontender, no nodules or masses present on palpation  · Respiratory  o Respiratory Effort  o : breathing unlabored  o Inspection of Chest  o : chest rise symmetric bilaterally  o Auscultation of Lungs  o : clear to auscultation bilaterally throughout inspiration and expiration  · Cardiovascular  o Heart  o :   § Auscultation of Heart  § : regular rate and rhythm, no murmurs, gallops or rubs  o Peripheral Vascular System  o :   § Extremities  § : no edema  · Lymphatic  o Neck  o : no cervical lymphadenopathy, no supraclavicular lymphadenopathy  · Psychiatric  o Mood and Affect  o : mood normal, affect appropriate              Assessment  · Fatigue     780.79/R53.83  Consider sleep study but pt wishes to hold off for now.  · GERD (gastroesophageal reflux disease)     530.81/K21.9  Stable. Continue current medication.  · Vera's esophagus     530.85/K22.70  Stable. Continue f/u with Dr. Boothe  · Anxiety     300.02/F41.1  Increase Zoloft to 50mg qd; f/u 3 mths.  · Allergic rhinitis     477.9/J30.9  Continue f/u with Allergist. Continue current medication for now.  · Patient receiving medication management services from refill clinic     V68.1/Z76.0    Problems Reconciled  Plan  · Orders  o ACO-39: Current medications updated and reviewed (, 1159F) - - 03/24/2021  · Medications  o Zoloft 50 mg oral tablet   SIG: take 1 tablet (50 mg) by oral route once daily for 30 days   DISP: (30) Tablet with 2 refills  Adjusted on 03/24/2021     o Medications have been Reconciled  o Transition of Care or Provider " Policy  · Instructions  o Maintain a healthy weight. Avoid tight fitting clothes. Avoid fried, fatty foods, tomato sauce, chocolate, mint, garlic, onion, alcohol. caffeine. Eat smaller meals, dont lie down after a meal, dont smoke. Elevate the head of your bed 6-9 inches.  o Take all medications as prescribed/directed.  o Patient was educated/instructed on their diagnosis, treatment and medications prior to discharge from the clinic today.  o Call the office with any concerns or questions.  o Chronic conditions reviewed and taken into consideration for today's treatment plan.  o Electronically Identified Patient Education Materials Provided Electronically  · Disposition  o Follow Up 3 months.            Electronically Signed by: CLYDE Cherry -Author on March 24, 2021 09:11:55 AM

## 2021-05-14 NOTE — PROGRESS NOTES
Progress Note      Patient Name: Ian Teixeira   Patient ID: [id number]   Sex: Male   YOB: 1990    Primary Care Provider: Monica TANG   Referring Provider: Monica TANG    Visit Date: January 5, 2021    Provider: DEZ Villalta   Location: [facility]   Location Address: [address] [place], [place] [address]  [id number]   Location Phone: [phone]          Chief Complaint  · GERD  · Vera's Esophagus  · IBS      History Of Present Illness     30-year-old male with a history of GERD, Vera's esophagus, and IBS presents today with complaints of abdominal pain.  CT abdomen/pelvis 12/2020 revealed shaggy irregular colonic wall thickening involving the descending and transverse colon. Review of his EGD/colonoscopy February 2020 by Dr. Boothe revealed small hiatal hernia, Vera's esophagus without dysplasia, gastritis, internal hemorrhoids, and normal mucosa throughout the whole colon.  He reports that his pain is generalized all over and he especially feels it in his groin area and has painful urination.  He also complains of having red blisters in his rectal area.  He reports having nausea that occurs with eating.       Past Medical History  Allergic rhinitis; Anxiety; GERD (gastroesophageal reflux disease)         Past Surgical History  Colonoscopy; EGD; Spleen Removal         Medication List  buspirone 10 mg oral tablet; Claritin 10 mg oral tablet; omeprazole 40 mg oral capsule,delayed release(/EC); Singulair 10 mg oral tablet         Allergy List  NO KNOWN DRUG ALLERGIES       Allergies Reconciled  Family Medical History  - No Family History of Colorectal Cancer         Social History  Alcohol (Current some day); ; Tobacco (Former)         Immunizations  Name Date Admin   Influenza 10/01/2020         Review of Systems  · Constitutional  o Denies  o : chills, fever  · Cardiovascular  o Denies  o : chest pain  · Respiratory  o Denies  o :  "shortness of breath  · Gastrointestinal  o Admits  o : nausea  o Denies  o : vomiting, dysphagia  · Endocrine  o Denies  o : weight gain, weight loss      Vitals  Date Time BP Position Site L\R Cuff Size HR RR TEMP (F) WT  HT  BMI kg/m2 BSA m2 O2 Sat FR L/min FiO2 HC       01/05/2021 09:14 /78 Sitting    74 - R 16  161lbs 0oz 5'  4\" 27.64 1.82 100 %            Physical Examination  · Constitutional  o Appearance  o : Healthy-appearing, awake and alert in no acute distress  · Head and Face  o Head  o : Normocephalic with no worriesome skin lesions  · Eyes  o Vision  o :   § Visual Fields  § : eyes move symmetrical in all directions  o Sclerae  o : sclerae anicteric  · Neck  o Inspection/Palpation  o : Trachea is midline, no adenopathy  · Respiratory  o Respiratory Effort  o : Breathing is unlabored.  o Inspection of Chest  o : normal appearance  o Auscultation of Lungs  o : Chest is clear to auscultation bilaterally.  · Cardiovascular  o Heart  o :   § Auscultation of Heart  § : no murmurs, rubs, or gallops  o Peripheral Vascular System  o :   § Extremities  § : no cyanosis, clubbing or edema;   · Gastrointestinal  o Abdominal Examination  o : Abdomen is soft, nontender to palpation, with normal active bowel sounds, no appreciable hepatosplenomegaly.          Assessment  · Abdominal Pain, Generalized     789.07/R10.84  · Vera's esophagus     530.85  · Gastroesophageal Reflux     530.81/K21.9  · Irritable Bowel Syndrome     564.1/K58.9      Plan  · Medications  o metronidazole 500 mg oral tablet   SIG: take 1 tablet by oral route every 8 hours for 7 days   DISP: (21) Tablet with 0 refills  Prescribed on 01/05/2021     o ciprofloxacin HCl 500 mg oral tablet   SIG: take 1 tablet (500 mg) by oral route every 12 hours for 7 days   DISP: (14) Tablet with 0 refills  Prescribed on 01/05/2021     o Medications have been Reconciled  o Transition of Care or Provider Policy  · Instructions  o 30-year-old male with a " history of GERD, Vera's esophagus, and IBS returns resents today for the complaints of abdominal pain. His CT from December 2020 was reviewed in the visit. We also discussed the results of his procedure from his colonoscopy February 2020. I have recommended that he continue his follow-up with urology in regards to his pain in his groin, dysuria, and rectal blisters. I have sent in a prescription of Cipro and Flagyl. I am going to have him follow-up in 3 months, and if still having abdominal pain, I will consider [facility] testing, CBC, CMP, iron profile, sed rate, and CRP at that time.            Electronically Signed by: DEZ Villalta -Author on January 5, 2021 09:51:32 AM  Electronically Co-signed by: Donald Boothe MD -Reviewer on January 10, 2021 08:15:56 PM

## 2021-05-14 NOTE — PROGRESS NOTES
Progress Note      Patient Name: Ian Teixeira   Patient ID: [id number]   Sex: Male   YOB: 1990    Primary Care Provider: Monica TANG   Referring Provider: Piper GARCES    Visit Date: December 17, 2020    Provider: CLYDE Cherry   Location: [facility] [place]   Location Address: [address] [place] [address], [address]  [place], [place]  [id number]   Location Phone: [phone]          Chief Complaint  · follow up   · medication refills       History Of Present Illness  Ian Teixeira is a 30 year old /White male who presents for evaluation and treatment of:      Patient is here for follow up and medication refills     He states he was seen Monday at [facility] ER for stomach pain/cramps and also painful urination and painful ejactulation. They did labs/urine and stated everything was good but stated he needs to see urology; he has appt for next week. ER records reviewed. He c/o groin, epigastric and lower abdominal pain. He states that his pain currently is 7-8/10 but pt is currently resting comfortably during visit. Pt has had numerous STD tests in the past and questions results; while his wife has never admitted to being unfaithful; he has suspicion. He did have a CT Abd in 11/2019 which was essentially normal. He has been seen by GI (Tl) for CLN; dx with IBS.     Anxiety: Patient is currently on Buspirone but states he is only taking 10mg qhs. He stopped the 7.5mg in the am. He is still anxious and may have some paranoia.    GERD: He is currently taking Omeprazole with good results. He is needing refill today. He denies reflux but does c/o abdominal pain for the past year.    AR: He is taking Claritin and Singluair with good results and states it works well.          Past Medical History  Disease Name Date Onset Notes   Allergic rhinitis --  --    Anxiety --  --    GERD (gastroesophageal reflux disease) --  --          Past Surgical History  Procedure Name  "Date Notes   Colonoscopy 2020 --    EGD 2020 --    Spleen Removal --  --          Medication List  Name Date Started Instructions   buspirone 10 mg oral tablet  take 1 tablet by oral route daily   Claritin 10 mg oral tablet 11/12/2020 take 1 tablet (10 mg) by oral route once daily for 30 days   omeprazole 40 mg oral capsule,delayed release(DR/EC) 09/21/2020 take 1 capsule (40 mg) by oral route once daily before a meal for 90 days   Singulair 10 mg oral tablet 11/12/2020 take 1 tablet (10 mg) by oral route once daily in the evening for 30 days         Allergy List  Allergen Name Date Reaction Notes   NO KNOWN DRUG ALLERGIES --  --  --        Allergies Reconciled  Family Medical History  Disease Name Relative/Age Notes   - No Family History of Colorectal Cancer  --          Social History  Finding Status Start/Stop Quantity Notes   Alcohol Current some day --/-- --  drinks weekly, 2 drinks per day, has been drinking for 6-10 years    --  --/-- --  --    Tobacco Former --/-- --  former smoker, smoked 6-10 years         Immunizations  NameDate Admin Mfg Trade Name Lot Number Route Inj VIS Given VIS Publication   [id number]/01/2020 SKB Fluzone Quadriv[employer]  NE NE     Comments: received @ [facility]         Review of Systems  · Constitutional  o Denies  o : fever, fatigue, weight loss, weight gain  · Cardiovascular  o Denies  o : lower extremity edema, claudication, chest pressure, palpitations  · Respiratory  o Denies  o : shortness of breath, wheezing, cough, hemoptysis, dyspnea on exertion  · Gastrointestinal  o Admits  o : abdominal pain  o Denies  o : nausea, vomiting, diarrhea, constipation  · Genitourinary  o Admits  o : dysuria  · Psychiatric  o Admits  o : anxiety      Vitals  Date Time BP Position Site L\R Cuff Size HR RR TEMP (F) WT  HT  BMI kg/m2 BSA m2 O2 Sat FR L/min FiO2 HC       12/17/2020 08:34 /77 Sitting    70 - R  97.8 165lbs 8oz 5'  4\" 28.41 1.84 99 %  21%          Physical " Examination  · Constitutional  o Appearance  o : well developed, well-nourished, no acute distress  · Head and Face  o Head  o : normocephalic, atraumatic  · Neck  o Inspection/Palpation  o : normal appearance, no masses or tenderness, trachea midline  o Thyroid  o : gland size normal, nontender, no nodules or masses present on palpation  · Respiratory  o Respiratory Effort  o : breathing unlabored  o Inspection of Chest  o : chest rise symmetric bilaterally  o Auscultation of Lungs  o : clear to auscultation bilaterally throughout inspiration and expiration  · Cardiovascular  o Heart  o :   § Auscultation of Heart  § : regular rate and rhythm, no murmurs, gallops or rubs  o Peripheral Vascular System  o :   § Extremities  § : no edema  · Gastrointestinal  o Abdomen  o : soft, tender in epigastric and LLQ without rebound or guarding, non-distended, + bowel sounds, no hepatosplenomegaly, no masses palpated  · Lymphatic  o Neck  o : no cervical lymphadenopathy, no supraclavicular lymphadenopathy  · Psychiatric  o Mood and Affect  o : mood normal, affect appropriate              Assessment  · Abdominal pain     789.00/R10.9  · GERD (gastroesophageal reflux disease)     530.81/K21.9  · Anxiety     300.02/F41.1  · Allergic rhinitis     477.9/J30.9  · Dysuria     788.1/R30.0       If CT and Uro referral and labs are negative, consider anxiety as a source of pt's sx. Consider referral to Dr. Galeano for evaluation of anxiety and possible paranoia.     Problems Reconciled  Plan  · Orders  o Abdomen and Pelvis (CT) (with contrast) (21042) - 789.00/R10.9 - 12/17/2020  o ACO-39: Current medications updated and reviewed (1159F, ) - - 12/17/2020  o H pylori IgM ab (88743) - 789.00/R10.9 - 12/17/2020  · Medications  o Medications have been Reconciled  o Transition of Care or Provider Policy  · Instructions  o Instructed to seek medical attention urgently for new or worsening symptoms.  o Maintain a healthy weight. Avoid tight  fitting clothes. Avoid fried, fatty foods, tomato sauce, chocolate, mint, garlic, onion, alcohol. caffeine. Eat smaller meals, dont lie down after a meal, dont smoke. Elevate the head of your bed 6-9 inches.  o Patient was educated/instructed on their diagnosis, treatment and medications prior to discharge from the clinic today.  o Patient instructed to seek medical attention urgently for new or worsening symptoms.  o Call the office with any concerns or questions.  o Chronic conditions reviewed and taken into consideration for today's treatment plan.  o Electronically Identified Patient Education Materials Provided Electronically  · Disposition  o Call or Return if symptoms worsen or persist.  o Follow Up PRN.            Electronically Signed by: CLYDE Cherry -Author on December 17, 2020 11:55:45 AM

## 2021-05-14 NOTE — PROGRESS NOTES
Progress Note      Patient Name: Ian Teixeira   Patient ID: [id number]   Sex: Male   YOB: 1990    Primary Care Provider: Monica TANG   Referring Provider: Monica TANG    Visit Date: January 21, 2021    Provider: CLYDE Cherry   Location: [facility] [place]   Location Address: [address] [place] [address], [address]  [place], [place]  [id number]   Location Phone: [phone]          Chief Complaint  · follow up       History Of Present Illness  Ian Teixeira is a 30 year old /White male who presents for evaluation and treatment of:      patient is here today for follow up     He states he has been seen by GI and Urology and states he has been diagnosed with Colitis. He states he was not happy with GI MORGAN Kemp and prefers not to see her again, but was advised by Urology to have a Colonoscopy. He states he is still having pain and same symptoms as before.     Anxiety: Pt quit taking Buspar and states that it wasn't working. but states that he was advised by Urology that his symptoms could be signs of Colon Cancer and that made his anxiety worse. Pt did not seek therapist as previously recommended.    GERD/Vera's esophagitis: He is taking Omeprazole with good results. He was recently diagnosed with Colitis and treated with antibiotics with GI.    AR: He is taking Claritin and Singulair and doing well          Past Medical History  Disease Name Date Onset Notes   Allergic rhinitis --  --    Anxiety --  --    GERD (gastroesophageal reflux disease) --  --          Past Surgical History  Procedure Name Date Notes   Colonoscopy 2020 --    EGD 2020 --    Spleen Removal --  --          Medication List  Name Date Started Instructions   buspirone 10 mg oral tablet  take 1 tablet by oral route daily   Claritin 10 mg oral tablet 11/12/2020 take 1 tablet (10 mg) by oral route once daily for 30 days   omeprazole 40 mg oral capsule,delayed release(/EC) 09/21/2020  "take 1 capsule (40 mg) by oral route once daily before a meal for 90 days   Singulair 10 mg oral tablet 11/12/2020 take 1 tablet (10 mg) by oral route once daily in the evening for 30 days         Allergy List  Allergen Name Date Reaction Notes   NO KNOWN DRUG ALLERGIES --  --  --        Allergies Reconciled  Family Medical History  Disease Name Relative/Age Notes   - No Family History of Colorectal Cancer  --          Social History  Finding Status Start/Stop Quantity Notes   Alcohol Current some day --/-- --  drinks weekly, 2 drinks per day, has been drinking for 6-10 years    --  --/-- --  --    Tobacco Former --/-- --  former smoker, smoked 6-10 years         Immunizations  NameDate Admin Mfg Trade Name Lot Number Route Inj VIS Given VIS Publication   [id number]/01/2020 SKB Fluzone [employer]riv[employer]  NE NE     Comments: received @ [facility]         Review of Systems  · Constitutional  o Denies  o : fever, fatigue, weight loss, weight gain  · Cardiovascular  o Denies  o : lower extremity edema, claudication, chest pressure, palpitations  · Respiratory  o Denies  o : shortness of breath, wheezing, cough, hemoptysis, dyspnea on exertion  · Gastrointestinal  o Admits  o : nausea, abdominal pain  o Denies  o : vomiting, constipation  · Psychiatric  o Denies  o : anxiety      Vitals  Date Time BP Position Site L\R Cuff Size HR RR TEMP (F) WT  HT  BMI kg/m2 BSA m2 O2 Sat FR L/min FiO2 HC       01/21/2021 09:39 /72 Sitting    71 - R  97.9 161lbs 8oz 5'  4\" 27.72 1.82 100 %  21%          Physical Examination  · Constitutional  o Appearance  o : well developed, well-nourished, no acute distress  · Head and Face  o Head  o : normocephalic, atraumatic  · Neck  o Inspection/Palpation  o : normal appearance, no masses or tenderness, trachea midline  o Thyroid  o : gland size normal, nontender, no nodules or masses present on palpation  · Respiratory  o Respiratory Effort  o : breathing unlabored  o Inspection of " Chest  o : chest rise symmetric bilaterally  o Auscultation of Lungs  o : clear to auscultation bilaterally throughout inspiration and expiration  · Cardiovascular  o Heart  o :   § Auscultation of Heart  § : regular rate and rhythm, no murmurs, gallops or rubs  o Peripheral Vascular System  o :   § Extremities  § : no edema  · Lymphatic  o Neck  o : no cervical lymphadenopathy, no supraclavicular lymphadenopathy  · Psychiatric  o Mood and Affect  o : mood normal, affect appropriate          Assessment  · Abdominal pain     789.00/R10.9  · Anxiety disorder     300.00/F41.9  · GERD (gastroesophageal reflux disease)     530.81/K21.9  · Allergic rhinitis     477.9/J30.9  · Colitis     558.9/K52.9  · Vera's esophagus     530.85/K22.70  · Abnormal CT of the abdomen     793.6/R93.5       Per the CT; colon wall thickening was suspected to be infectious or inflammatory etiology. He was treated with antibiotics. I will check some preliminary inflammatory markers. Pt will still need to f/u with GI for further evaluation. Suggested counseling. Will try Zoloft 25mg qd. Discussed administration and risks. Pt to f/u about 6-8 weeks for re-evaluation of anxiety. Pt voiced understanding.     Problems Reconciled  Plan  · Orders  o ACO-39: Current medications updated and reviewed (, 1159F) - - 01/21/2021  o RA Profile 1 (Uric Acid, ASO, RF IgM, FLOYD, CRP) [facility] (39777, 26112, 93495, 03171, 67453) - 558.9/K52.9, 793.6/R93.5, 789.00/R10.9 - 01/21/2021  o ESR (63601) - 558.9/K52.9, 793.6/R93.5, 789.00/R10.9 - 01/21/2021  · Medications  o Zoloft 25 mg oral tablet   SIG: take 1 tablet (25 mg) by oral route once daily for 30 days   DISP: (30) Tablet with 1 refills  Prescribed on 01/21/2021     o buspirone 10 mg oral tablet   SIG: take 1 tablet by oral route daily   DISP: (0) Tablet with 0 refills  Discontinued on 01/21/2021     o Medications have been Reconciled  o Transition of Care or Provider Policy  · Instructions  o Instructed to  seek medical attention urgently for new or worsening symptoms.  o Discussed the need for therapy, either with a certified counselor, psychologist, and/or family . If no improvement is noted or worsening of their condition, return to office or ER. But also discussed with patient that if they are non-responsive to the type of medication they may need to see a psychiatrist for further evaluation and management.  o Patient was given an SSRI/SSNRI medication and warned of possible side effects of the medication including potential for increased risk of suicidal thoughts and feelings. Patient was instructed that if they begin to exhibit any of these effects they will discontinue the medication immediately and contact our office or the ER ASAP.  o Maintain a healthy weight. Avoid tight fitting clothes. Avoid fried, fatty foods, tomato sauce, chocolate, mint, garlic, onion, alcohol. caffeine. Eat smaller meals, dont lie down after a meal, dont smoke. Elevate the head of your bed 6-9 inches.  o Take all medications as prescribed/directed.  o Patient was educated/instructed on their diagnosis, treatment and medications prior to discharge from the clinic today.  o Patient instructed to seek medical attention urgently for new or worsening symptoms.  o Call the office with any concerns or questions.  o Chronic conditions reviewed and taken into consideration for today's treatment plan.  o Discussed Covid-19 precautions including, but not limited to, social distancing, avoid touching your face, and hand washing.   o Electronically Identified Patient Education Materials Provided Electronically  · Disposition  o Follow Up 2 months.            Electronically Signed by: CLYDE Cherry -Author on January 22, 2021 04:08:36 PM

## 2021-05-15 VITALS
HEIGHT: 64 IN | RESPIRATION RATE: 12 BRPM | SYSTOLIC BLOOD PRESSURE: 153 MMHG | BODY MASS INDEX: 27.02 KG/M2 | WEIGHT: 158.25 LBS | HEART RATE: 82 BPM | OXYGEN SATURATION: 100 % | DIASTOLIC BLOOD PRESSURE: 74 MMHG

## 2021-06-14 PROBLEM — F41.9 ANXIETY: Status: ACTIVE | Noted: 2021-06-14

## 2021-06-14 PROBLEM — K21.9 GERD (GASTROESOPHAGEAL REFLUX DISEASE): Status: ACTIVE | Noted: 2021-01-21

## 2021-06-14 PROBLEM — J30.9 ALLERGIC RHINITIS: Status: ACTIVE | Noted: 2021-06-14

## 2021-06-24 NOTE — PROGRESS NOTES
Chief Complaint  Chief Complaint   Patient presents with   • Follow-up     anxiety        Subjective          Ian Teixeira presents to [facility]  History of Present Illness   Patient is here today for a follow up     Anxiety: Patient is currently on Zoloft for anxiety and states he really can't tell if it's working or not . Patient states that he still has a lot of anxiety symptoms from time to time; feels like sx are a bit better than initially.    GERD: Patient is currently on Omeprazole 40mg for the treatment of GERD. Patient is doing well without breakthrough symptoms.     AR: Patient is currently on Claritin and Singulair and states he does ok but he still has a lot of breakthrough symptoms. He is followed by [facility] 6m and is also getting weekly allergy injections. He is needing refill on Claritin today. Had f/u with allergist last Friday.    Patient also requests for referral for sterilization.       Medical History: has a past medical history of Allergic rhinitis, Anxiety, Vera's esophagus (01/21/2021), and GERD (gastroesophageal reflux disease).   Surgical History: has a past surgical history that includes Colonoscopy (2020); Esophagogastroduodenoscopy (2020); and Splenectomy, total.   Family History: family history includes Other in an other family member.   Social History: reports that he has quit smoking. He quit after 6.00 years of use. He has never used smokeless tobacco. He reports current alcohol use. He reports that he does not use drugs.    Allergies: Patient has no known allergies.    Health Maintenance Due   Topic Date Due   • ANNUAL PHYSICAL  Never done   • COVID-19 Vaccine (1) Never done   • TDAP/TD VACCINES (1 - Tdap) Never done   • HEPATITIS C SCREENING  Never done       Immunization History   Administered Date(s) Administered   • Flu Vaccine Quad PF >18YRS 01/17/2018, 11/07/2018, 10/21/2019, 09/23/2020   • Hep B, Adolescent or  "Pediatric 01/25/2015   • Hepatitis B 11/07/2018   • Hib (PRP-T) 01/25/2015   • Influenza, Unspecified 01/17/2018, 11/07/2018, 10/01/2020   • Meningococcal Conjugate 01/25/2015   • Pneumococcal Conjugate 13-Valent (PCV13) 01/25/2015   • Pneumococcal Polysaccharide (PPSV23) 01/25/2015, 12/06/2019       Objective     Vitals:    06/28/21 0837   BP: 135/83   Pulse: 51   Temp: 97.5 °F (36.4 °C)   TempSrc: Oral   SpO2: 98%   Weight: 76 kg (167 lb 8 oz)   Height: 162.6 cm (64\")     Body mass index is 28.75 kg/m².       Physical Exam  Vitals and nursing note reviewed.   Constitutional:       Appearance: Normal appearance.   HENT:      Head: Normocephalic and atraumatic.   Neck:      Vascular: No carotid bruit.      Comments: Thyroid : gland size normal, nontender, no nodules or masses present on palpation   Cardiovascular:      Rate and Rhythm: Normal rate and regular rhythm.      Pulses: Normal pulses.      Heart sounds: Normal heart sounds.   Pulmonary:      Effort: Pulmonary effort is normal.      Breath sounds: Normal breath sounds.   Musculoskeletal:      Cervical back: Neck supple. No tenderness.      Right lower leg: No edema.      Left lower leg: No edema.   Lymphadenopathy:      Cervical: No cervical adenopathy.   Neurological:      Mental Status: He is alert.   Psychiatric:         Mood and Affect: Mood normal.         Behavior: Behavior normal.           Result Review :                           Assessment and Plan      Diagnoses and all orders for this visit:    1. Anxiety (Primary)  Assessment & Plan:  Not improving; increase Zoloft to 100mg qd. F/u if sx don't improve. Recheck in 6mth.    Orders:  -     sertraline (ZOLOFT) 50 MG tablet; Take 2 tablets by mouth Daily.  Dispense: 90 tablet; Refill: 1  -     Comprehensive Metabolic Panel; Future  -     CBC & Differential; Future  -     TSH; Future    2. Allergic rhinitis, unspecified seasonality, unspecified trigger  Assessment & Plan:  Patient is currently " stable; continue current regimen.    Orders:  -     loratadine (Claritin) 10 MG tablet; Take 1 tablet by mouth Daily.  Dispense: 90 tablet; Refill: 1  -     CBC & Differential; Future    3. Gastroesophageal reflux disease, unspecified whether esophagitis present  Assessment & Plan:  Patient is currently stable; continue current regimen.    Orders:  -     Comprehensive Metabolic Panel; Future  -     CBC & Differential; Future    4. Encounter for medication refill  -     CBC & Differential; Future    5. Need for hepatitis C screening test  -     Hepatitis C antibody; Future    6. Screening for lipid disorders  -     Lipid Panel; Future    7. Request for sterilization  Comments:  Referral to urology.  Orders:  -     Ambulatory Referral to Urology          Follow Up     Return in about 6 months (around 12/28/2021) for Recheck.    Patient was given instructions and counseling regarding his condition or for health maintenance advice. Please see specific information pulled into the AVS if appropriate.

## 2021-06-28 ENCOUNTER — OFFICE VISIT (OUTPATIENT)
Dept: FAMILY MEDICINE CLINIC | Facility: CLINIC | Age: 31
End: 2021-06-28

## 2021-06-28 VITALS
BODY MASS INDEX: 28.6 KG/M2 | OXYGEN SATURATION: 98 % | DIASTOLIC BLOOD PRESSURE: 83 MMHG | SYSTOLIC BLOOD PRESSURE: 135 MMHG | WEIGHT: 167.5 LBS | HEART RATE: 51 BPM | TEMPERATURE: 97.5 F | HEIGHT: 64 IN

## 2021-06-28 DIAGNOSIS — Z76.0 ENCOUNTER FOR MEDICATION REFILL: ICD-10-CM

## 2021-06-28 DIAGNOSIS — J30.9 ALLERGIC RHINITIS, UNSPECIFIED SEASONALITY, UNSPECIFIED TRIGGER: Chronic | ICD-10-CM

## 2021-06-28 DIAGNOSIS — Z30.2 REQUEST FOR STERILIZATION: ICD-10-CM

## 2021-06-28 DIAGNOSIS — Z11.59 NEED FOR HEPATITIS C SCREENING TEST: ICD-10-CM

## 2021-06-28 DIAGNOSIS — Z13.220 SCREENING FOR LIPID DISORDERS: ICD-10-CM

## 2021-06-28 DIAGNOSIS — K21.9 GASTROESOPHAGEAL REFLUX DISEASE, UNSPECIFIED WHETHER ESOPHAGITIS PRESENT: Chronic | ICD-10-CM

## 2021-06-28 DIAGNOSIS — F41.9 ANXIETY: Primary | Chronic | ICD-10-CM

## 2021-06-28 PROBLEM — K58.9 IRRITABLE BOWEL SYNDROME: Status: ACTIVE | Noted: 2021-06-28

## 2021-06-28 PROCEDURE — 99214 OFFICE O/P EST MOD 30 MIN: CPT | Performed by: PHYSICIAN ASSISTANT

## 2021-06-28 RX ORDER — LORATADINE 10 MG/1
10 TABLET ORAL DAILY
Qty: 90 TABLET | Refills: 1 | Status: SHIPPED | OUTPATIENT
Start: 2021-06-28 | End: 2021-11-02 | Stop reason: SDUPTHER

## 2021-07-15 ENCOUNTER — LAB (OUTPATIENT)
Dept: LAB | Facility: HOSPITAL | Age: 31
End: 2021-07-15

## 2021-07-15 VITALS
OXYGEN SATURATION: 99 % | HEART RATE: 62 BPM | SYSTOLIC BLOOD PRESSURE: 123 MMHG | WEIGHT: 163.12 LBS | HEIGHT: 64 IN | RESPIRATION RATE: 12 BRPM | BODY MASS INDEX: 27.85 KG/M2 | DIASTOLIC BLOOD PRESSURE: 71 MMHG

## 2021-07-15 DIAGNOSIS — Z13.220 SCREENING FOR LIPID DISORDERS: ICD-10-CM

## 2021-07-15 DIAGNOSIS — K21.9 GASTROESOPHAGEAL REFLUX DISEASE, UNSPECIFIED WHETHER ESOPHAGITIS PRESENT: ICD-10-CM

## 2021-07-15 DIAGNOSIS — F41.9 ANXIETY: ICD-10-CM

## 2021-07-15 DIAGNOSIS — J30.9 ALLERGIC RHINITIS, UNSPECIFIED SEASONALITY, UNSPECIFIED TRIGGER: ICD-10-CM

## 2021-07-15 DIAGNOSIS — Z11.59 NEED FOR HEPATITIS C SCREENING TEST: ICD-10-CM

## 2021-07-15 DIAGNOSIS — Z76.0 ENCOUNTER FOR MEDICATION REFILL: ICD-10-CM

## 2021-07-15 LAB
ALBUMIN SERPL-MCNC: 4.3 G/DL (ref 3.5–5.2)
ALBUMIN/GLOB SERPL: 2 G/DL
ALP SERPL-CCNC: 71 U/L (ref 39–117)
ALT SERPL W P-5'-P-CCNC: 16 U/L (ref 1–41)
ANION GAP SERPL CALCULATED.3IONS-SCNC: 10.8 MMOL/L (ref 5–15)
AST SERPL-CCNC: 24 U/L (ref 1–40)
BASOPHILS # BLD AUTO: 0.09 10*3/MM3 (ref 0–0.2)
BASOPHILS NFR BLD AUTO: 1.3 % (ref 0–1.5)
BILIRUB SERPL-MCNC: 0.4 MG/DL (ref 0–1.2)
BUN SERPL-MCNC: 19 MG/DL (ref 6–20)
BUN/CREAT SERPL: 18.3 (ref 7–25)
CALCIUM SPEC-SCNC: 9 MG/DL (ref 8.6–10.5)
CHLORIDE SERPL-SCNC: 106 MMOL/L (ref 98–107)
CHOLEST SERPL-MCNC: 178 MG/DL (ref 0–200)
CO2 SERPL-SCNC: 24.2 MMOL/L (ref 22–29)
CREAT SERPL-MCNC: 1.04 MG/DL (ref 0.76–1.27)
DEPRECATED RDW RBC AUTO: 43.4 FL (ref 37–54)
EOSINOPHIL # BLD AUTO: 0.18 10*3/MM3 (ref 0–0.4)
EOSINOPHIL NFR BLD AUTO: 2.7 % (ref 0.3–6.2)
ERYTHROCYTE [DISTWIDTH] IN BLOOD BY AUTOMATED COUNT: 13 % (ref 12.3–15.4)
GFR SERPL CREATININE-BSD FRML MDRD: 83 ML/MIN/1.73
GLOBULIN UR ELPH-MCNC: 2.1 GM/DL
GLUCOSE SERPL-MCNC: 73 MG/DL (ref 65–99)
HCT VFR BLD AUTO: 45 % (ref 37.5–51)
HCV AB SER DONR QL: NORMAL
HDLC SERPL-MCNC: 54 MG/DL (ref 40–60)
HGB BLD-MCNC: 14.4 G/DL (ref 13–17.7)
IMM GRANULOCYTES # BLD AUTO: 0.01 10*3/MM3 (ref 0–0.05)
IMM GRANULOCYTES NFR BLD AUTO: 0.1 % (ref 0–0.5)
LDLC SERPL CALC-MCNC: 116 MG/DL (ref 0–100)
LDLC/HDLC SERPL: 2.14 {RATIO}
LYMPHOCYTES # BLD AUTO: 2.7 10*3/MM3 (ref 0.7–3.1)
LYMPHOCYTES NFR BLD AUTO: 40.2 % (ref 19.6–45.3)
MCH RBC QN AUTO: 29.1 PG (ref 26.6–33)
MCHC RBC AUTO-ENTMCNC: 32 G/DL (ref 31.5–35.7)
MCV RBC AUTO: 90.9 FL (ref 79–97)
MONOCYTES # BLD AUTO: 0.79 10*3/MM3 (ref 0.1–0.9)
MONOCYTES NFR BLD AUTO: 11.8 % (ref 5–12)
NEUTROPHILS NFR BLD AUTO: 2.94 10*3/MM3 (ref 1.7–7)
NEUTROPHILS NFR BLD AUTO: 43.9 % (ref 42.7–76)
NRBC BLD AUTO-RTO: 0 /100 WBC (ref 0–0.2)
PLATELET # BLD AUTO: 359 10*3/MM3 (ref 140–450)
PMV BLD AUTO: 9.8 FL (ref 6–12)
POTASSIUM SERPL-SCNC: 4.1 MMOL/L (ref 3.5–5.2)
PROT SERPL-MCNC: 6.4 G/DL (ref 6–8.5)
RBC # BLD AUTO: 4.95 10*6/MM3 (ref 4.14–5.8)
SODIUM SERPL-SCNC: 141 MMOL/L (ref 136–145)
TRIGL SERPL-MCNC: 41 MG/DL (ref 0–150)
TSH SERPL DL<=0.05 MIU/L-ACNC: 0.85 UIU/ML (ref 0.27–4.2)
VLDLC SERPL-MCNC: 8 MG/DL (ref 5–40)
WBC # BLD AUTO: 6.71 10*3/MM3 (ref 3.4–10.8)

## 2021-07-15 PROCEDURE — 36415 COLL VENOUS BLD VENIPUNCTURE: CPT

## 2021-07-15 PROCEDURE — 80061 LIPID PANEL: CPT

## 2021-07-15 PROCEDURE — 86803 HEPATITIS C AB TEST: CPT

## 2021-07-15 PROCEDURE — 84443 ASSAY THYROID STIM HORMONE: CPT

## 2021-07-15 PROCEDURE — 80053 COMPREHEN METABOLIC PANEL: CPT

## 2021-07-15 PROCEDURE — 85025 COMPLETE CBC W/AUTO DIFF WBC: CPT

## 2021-08-12 PROBLEM — Z30.09 STERILIZATION CONSULT: Status: ACTIVE | Noted: 2021-08-12

## 2021-08-12 NOTE — PROGRESS NOTES
Chief Complaint: Undesired fertility         History of Present Illness:  Ian Teixeira is a 31 y.o. male presents for counseling regarding vasectomy for permanent sterilization. The procedure was discussed with the patient. Ian Teixeira is aware that the procedure should be considered irreversible, although at times it can be reversed with success. Risks for the procedure including local effects of selling, bleeding, pain, the possibility for recanalization, and continued fertility is also possible. Formation of a sperm granuloma also is a possibility. We discussed the procedure, preoperative preparation, and postoperative care. We also discussed the importance of continuing to use contraception post vasectomy, since the patient will not be sterile at that point. We stressed the importance of continuing to use contraception until a follow-up semen specimen is done that shows the absence of sperm. That specimen will normally be obtained eight weeks post-surgery. Ian Teixeira is voluntarily requesting the procedure and understands that if it is successful he will be unable to father children.     No anticoagulation, no previous scrotal surgery, no cardiopulmonary history    Alert and oriented x3  No acute distress  Unlabored respirations  Nontender/nondistended  Normal circumcised phallus, bilateral descended testicles without mass.  Bilateral vas deferens palpable  Grossly oriented to person place and time judgment is intact      Assessment and Plan      Consult for sterilization      Plan    Instructions  • The patient will schedule a vasectomy if he desires.   • Handouts were provided, vasectomy  scanned into the EMR for reference.   • Vasectomy is intended to be a permanent form of contraception.   • Vasectomy does not produce immediate sterility.   • Following vasectomy, another form of contraception is required until vas occlusion is confirmed by post-vasectomy semen analysis (PVSA).   • Even after  vas occlusion is confirmed, vasectomy is not 100% reliable in preventing pregnancy.   • The risk of pregnancy after vasectomy is approximately 1 in 2,000 for men who have no sperm in the semen on post-vasectomy semen analysis showing rare non-motile sperm (RNMS).   • Repeat vasectomy is necessary in <1% of vasectomies, provided that a technique for vas occlusion known to have low occlusive failure rate has been used.   • Patient's should refrain from ejaculation for approximately 1 week after vasectomy.   • Options for fertility after vasectomy reversal and sperm retrieval with in vitro fertilization. These options are not always successful, and are very expensive.   • The rates of surgical complications such as symptomatic hematoma and infection are 1-2%  • Chronic scrotal pain associated with negative impact on quality of life occurs after vasectomy in about 1-2% of men. Few of these men require additional surgery.   • Other permanent and non-permanent alternatives to vasectomy are available.  • Patient voiced understanding of the above statements.   • Electronically identified patient education materials provided electronically    Patient has decided to schedule a vasectomy.

## 2021-08-13 ENCOUNTER — OFFICE VISIT (OUTPATIENT)
Dept: UROLOGY | Facility: CLINIC | Age: 31
End: 2021-08-13

## 2021-08-13 VITALS — WEIGHT: 167 LBS | HEIGHT: 64 IN | RESPIRATION RATE: 17 BRPM | BODY MASS INDEX: 28.51 KG/M2

## 2021-08-13 DIAGNOSIS — Z30.09 STERILIZATION CONSULT: Primary | ICD-10-CM

## 2021-08-13 PROCEDURE — 99213 OFFICE O/P EST LOW 20 MIN: CPT | Performed by: UROLOGY

## 2021-08-30 ENCOUNTER — OFFICE VISIT (OUTPATIENT)
Dept: FAMILY MEDICINE CLINIC | Facility: CLINIC | Age: 31
End: 2021-08-30

## 2021-08-30 VITALS
WEIGHT: 167 LBS | HEART RATE: 82 BPM | HEIGHT: 64 IN | DIASTOLIC BLOOD PRESSURE: 67 MMHG | TEMPERATURE: 98.3 F | SYSTOLIC BLOOD PRESSURE: 120 MMHG | OXYGEN SATURATION: 96 % | BODY MASS INDEX: 28.51 KG/M2

## 2021-08-30 DIAGNOSIS — R51.9 ACUTE NONINTRACTABLE HEADACHE, UNSPECIFIED HEADACHE TYPE: Primary | ICD-10-CM

## 2021-08-30 DIAGNOSIS — R05.9 COUGH: ICD-10-CM

## 2021-08-30 DIAGNOSIS — R11.0 NAUSEA: ICD-10-CM

## 2021-08-30 PROCEDURE — 99213 OFFICE O/P EST LOW 20 MIN: CPT | Performed by: PHYSICIAN ASSISTANT

## 2021-08-30 PROCEDURE — U0003 INFECTIOUS AGENT DETECTION BY NUCLEIC ACID (DNA OR RNA); SEVERE ACUTE RESPIRATORY SYNDROME CORONAVIRUS 2 (SARS-COV-2) (CORONAVIRUS DISEASE [COVID-19]), AMPLIFIED PROBE TECHNIQUE, MAKING USE OF HIGH THROUGHPUT TECHNOLOGIES AS DESCRIBED BY CMS-2020-01-R: HCPCS | Performed by: PHYSICIAN ASSISTANT

## 2021-08-30 RX ORDER — ONDANSETRON 4 MG/1
4 TABLET, FILM COATED ORAL EVERY 8 HOURS PRN
Qty: 30 TABLET | Refills: 0 | Status: SHIPPED | OUTPATIENT
Start: 2021-08-30 | End: 2021-09-23

## 2021-09-01 LAB — SARS-COV-2 RNA RESP QL NAA+PROBE: DETECTED

## 2021-09-23 ENCOUNTER — OFFICE VISIT (OUTPATIENT)
Dept: FAMILY MEDICINE CLINIC | Facility: CLINIC | Age: 31
End: 2021-09-23

## 2021-09-23 VITALS
OXYGEN SATURATION: 98 % | BODY MASS INDEX: 27.69 KG/M2 | HEIGHT: 64 IN | WEIGHT: 162.2 LBS | DIASTOLIC BLOOD PRESSURE: 71 MMHG | SYSTOLIC BLOOD PRESSURE: 121 MMHG | HEART RATE: 61 BPM

## 2021-09-23 DIAGNOSIS — R35.0 URINARY FREQUENCY: Primary | ICD-10-CM

## 2021-09-23 DIAGNOSIS — Z86.16 HISTORY OF COVID-19: ICD-10-CM

## 2021-09-23 DIAGNOSIS — J30.9 ALLERGIC RHINITIS, UNSPECIFIED SEASONALITY, UNSPECIFIED TRIGGER: ICD-10-CM

## 2021-09-23 DIAGNOSIS — F41.9 ANXIETY: ICD-10-CM

## 2021-09-23 LAB
BILIRUB BLD-MCNC: NEGATIVE MG/DL
CLARITY, POC: CLEAR
COLOR UR: YELLOW
GLUCOSE UR STRIP-MCNC: NEGATIVE MG/DL
KETONES UR QL: NEGATIVE
LEUKOCYTE EST, POC: NEGATIVE
NITRITE UR-MCNC: NEGATIVE MG/ML
PH UR: 6.5 [PH] (ref 5–8)
PROT UR STRIP-MCNC: NEGATIVE MG/DL
RBC # UR STRIP: NEGATIVE /UL
SP GR UR: 1.02 (ref 1–1.03)
UROBILINOGEN UR QL: NORMAL

## 2021-09-23 PROCEDURE — 99214 OFFICE O/P EST MOD 30 MIN: CPT | Performed by: PHYSICIAN ASSISTANT

## 2021-09-23 NOTE — PROGRESS NOTES
Chief Complaint  Chief Complaint   Patient presents with   • Follow-up     pt was dx with Covid and is just coming in for a follow up. Pt states he has no sx's at this time.   • Nasal Congestion     pt complains of blood and mucuus every time he blows his nose. pt also states he has blown his nose and it is the color and texture of chocolate. this has been since dx with covid   • Irritable Bowel Syndrome     pt complains of RUQ pain, states his IBS is flared up and just wants to make sure its due to poor diet control       Subjective          Ian Teixeira presents to [facility]  History of Present Illness   Patient had COVID; diagnosed August 30th; has has some bleeding from nasal passages since    AR: on injections with [facility] patient would like second opinion ENT referral; patient states that he is on allergy shots and not seeing improvement.    Urinary frequency for the past 4 days after drinking increased amounts of energy drinks; states cramping epigastric area and right flank area at about 8/10 yesterday. Today 2/10 on pain scale. Denies nausea and vomiting.    Anxiety: Patient is currently on zoloft 1 for anxiety and doing well. Patient states that symptoms are well controlled.    Medical History: has a past medical history of Allergic rhinitis, Anxiety, Vera's esophagus (01/21/2021), and GERD (gastroesophageal reflux disease).   Surgical History: has a past surgical history that includes Colonoscopy (2020); Esophagogastroduodenoscopy (2020); and Splenectomy, total.   Family History: family history includes Other in an other family member.   Social History: reports that he has quit smoking. He quit after 6.00 years of use. He has never used smokeless tobacco. He reports current alcohol use. He reports that he does not use drugs.    Allergies: Patient has no known allergies.    Health Maintenance Due   Topic Date Due   • ANNUAL PHYSICAL  Never done   •  "COVID-19 Vaccine (1) Never done   • TDAP/TD VACCINES (1 - Tdap) Never done       Immunization History   Administered Date(s) Administered   • Flu Vaccine Quad PF >18YRS 01/17/2018, 11/07/2018, 10/21/2019, 09/23/2020   • Hep B, Adolescent or Pediatric 01/25/2015   • Hepatitis B 11/07/2018   • Hib (PRP-T) 01/25/2015   • Influenza, Unspecified 01/17/2018, 11/07/2018, 10/01/2020   • Meningococcal Conjugate 01/25/2015   • Pneumococcal Conjugate 13-Valent (PCV13) 01/25/2015   • Pneumococcal Polysaccharide (PPSV23) 01/25/2015, 12/06/2019       Objective     Vitals:    09/23/21 1033   BP: 121/71   BP Location: Left arm   Patient Position: Sitting   Cuff Size: Adult   Pulse: 61   SpO2: 98%   Weight: 73.6 kg (162 lb 3.2 oz)   Height: 162.6 cm (64\")     Body mass index is 27.84 kg/m².       Physical Exam  Vitals and nursing note reviewed.   Constitutional:       Appearance: Normal appearance.   HENT:      Head: Normocephalic and atraumatic.      Right Ear: Tympanic membrane and ear canal normal.      Left Ear: Tympanic membrane and ear canal normal.   Neck:      Vascular: No carotid bruit.      Comments: Thyroid : gland size normal, nontender, no nodules or masses present on palpation   Cardiovascular:      Rate and Rhythm: Normal rate and regular rhythm.      Pulses: Normal pulses.      Heart sounds: Normal heart sounds.   Pulmonary:      Effort: Pulmonary effort is normal.      Breath sounds: Normal breath sounds.   Abdominal:      Palpations: Abdomen is soft. There is no mass.      Tenderness: There is no abdominal tenderness.   Musculoskeletal:      Cervical back: Neck supple. No tenderness.      Right lower leg: No edema.      Left lower leg: No edema.   Lymphadenopathy:      Cervical: No cervical adenopathy.   Neurological:      Mental Status: He is alert.   Psychiatric:         Mood and Affect: Mood normal.         Behavior: Behavior normal.           Result Review :                           Assessment and Plan  "     Diagnoses and all orders for this visit:    1. Urinary frequency (Primary)  Comments:  U/A clear; discussed cessation of energy drinks.  Orders:  -     POC Urinalysis Dipstick, Multipro    2. Allergic rhinitis, unspecified seasonality, unspecified trigger  Comments:  Same: Patient states would like to see different ENT for allergy symptoms. New referral input. Continue Claritin 10mg daily and Singulair 10mg daily for now.  Orders:  -     Ambulatory Referral to ENT (Otolaryngology)    3. History of COVID-19  Comments:  Symtpoms improved.    4. Anxiety  Assessment & Plan:  Stable on Zoloft 50mg daily; continue current medication and follow up in 6mths.            Follow Up     No follow-ups on file.    Patient was given instructions and counseling regarding his condition or for health maintenance advice. Please see specific information pulled into the AVS if appropriate.

## 2021-09-30 ENCOUNTER — OFFICE VISIT (OUTPATIENT)
Dept: OTOLARYNGOLOGY | Facility: CLINIC | Age: 31
End: 2021-09-30

## 2021-09-30 VITALS — HEIGHT: 64 IN | TEMPERATURE: 97.7 F | BODY MASS INDEX: 28.34 KG/M2 | WEIGHT: 166 LBS

## 2021-09-30 DIAGNOSIS — J30.9 ALLERGIC RHINITIS, UNSPECIFIED SEASONALITY, UNSPECIFIED TRIGGER: Primary | ICD-10-CM

## 2021-09-30 PROCEDURE — 99203 OFFICE O/P NEW LOW 30 MIN: CPT | Performed by: NURSE PRACTITIONER

## 2021-09-30 RX ORDER — FLUTICASONE PROPIONATE 50 MCG
2 SPRAY, SUSPENSION (ML) NASAL DAILY
Qty: 16 G | Refills: 2 | Status: SHIPPED | OUTPATIENT
Start: 2021-09-30 | End: 2021-12-06 | Stop reason: SDUPTHER

## 2021-09-30 NOTE — PROGRESS NOTES
Patient Name: Ian Teixeira   Visit Date: 09/30/2021   Patient ID: [id number]  Provider: DEZ Cruz    Sex: male  Location: [facility]   YOB: 1990  Location Address: [address] [place] [address], [address] [place] [address],?[place]?[id number]    Primary Care Provider Monica Crump PA  Location Phone: [phone]    Referring Provider: Monica Crump, *        Chief Complaint  Sinusitis    Subjective   Ian Teixeira is a 31 y.o. male who presents to [facility] today as a consult from Monica Crump, * for evaluation of rhinitis.  He states that he is here for second opinion.  He is currently being seen by [facility].  He reports that he has been seen there for at least a year and has been getting once weekly allergy injections.  He states that he has seen no improvement in his allergy symptoms since starting the shots.  He reports that he is chronically congested in his nose and has frequent clear rhinitis.  This is worse while he is working.  He states that he works for a large [employer] and is often augie and the building.  Reports he is on Claritin and Singulair daily.  He is not using any nasal sprays.  He has had allergy testing but is unsure of what he is allergic to.  He feels like he breathes well through his nose.  He has had no issues with nasal obstruction or nasal trauma.  He did have a CT scan of his sinuses performed on 11/5/2020.  This showed small air-fluid levels in the left sphenoid sinus.  No other acute sinusitis.  There was some mild mucosal thickening in the paranasal sinuses.  The left-sided ostiomeatal complex unit was partially occluded.  There are bilateral accessory medial meatus I.  Bilateral sphenoid ethmoidal recesses are partially occluded.  He has an inferior leftward septal spur.      Current Outpatient Medications on File Prior to Visit   Medication Sig   • loratadine (Claritin) 10 MG tablet Take  "1 tablet by mouth Daily.   • montelukast (Singulair) 10 MG tablet Take 1 tablet by mouth Daily.   • omeprazole (priLOSEC) 40 MG capsule Take 1 capsule by mouth Daily.   • sertraline (ZOLOFT) 50 MG tablet Take 2 tablets by mouth Daily. (Patient taking differently: Take 50 mg by mouth Daily.)     No current facility-administered medications on file prior to visit.        Social History     Tobacco Use   • Smoking status: Former Smoker     Years: 6.00     Types: Cigarettes     Quit date:      Years since quittin.7   • Smokeless tobacco: Never Used   Vaping Use   • Vaping Use: Former   • Substances: CBD   Substance Use Topics   • Alcohol use: Yes     Comment: DRINKS WEEKLY, 2 DRINKS PER DAYS, HAS BEEN DRINKING FOR 6-10 YEARS   • Drug use: Never       Objective     Vital Signs:   Temp 97.7 °F (36.5 °C) (Temporal)   Ht 162.6 cm (64\")   Wt 75.3 kg (166 lb)   BMI 28.49 kg/m²       Physical Exam  Constitutional:       General: He is not in acute distress.     Appearance: Normal appearance. He is not ill-appearing.   HENT:      Head: Normocephalic and atraumatic.      Jaw: There is normal jaw occlusion. No tenderness or pain on movement.      Salivary Glands: Right salivary gland is not diffusely enlarged or tender. Left salivary gland is not diffusely enlarged or tender.      Right Ear: Tympanic membrane, ear canal and external ear normal.      Left Ear: Tympanic membrane, ear canal and external ear normal.      Nose: Rhinorrhea present. No septal deviation.      Right Sinus: No maxillary sinus tenderness or frontal sinus tenderness.      Left Sinus: No maxillary sinus tenderness or frontal sinus tenderness.      Mouth/Throat:      Lips: Pink. No lesions.      Mouth: Mucous membranes are moist. No oral lesions.      Dentition: Normal dentition.      Tongue: No lesions.      Palate: No mass and lesions.      Pharynx: Oropharynx is clear. Uvula midline.      Tonsils: No tonsillar exudate.   Eyes:      Extraocular " Movements: Extraocular movements intact.      Conjunctiva/sclera: Conjunctivae normal.      Pupils: Pupils are equal, round, and reactive to light.   Neck:      Thyroid: No thyroid mass, thyromegaly or thyroid tenderness.      Trachea: Trachea normal.   Pulmonary:      Effort: Pulmonary effort is normal. No respiratory distress.   Musculoskeletal:         General: Normal range of motion.      Cervical back: Normal range of motion and neck supple. No tenderness.   Lymphadenopathy:      Cervical: No cervical adenopathy.   Skin:     General: Skin is warm and dry.   Neurological:      General: No focal deficit present.      Mental Status: He is alert and oriented to person, place, and time.      Cranial Nerves: No cranial nerve deficit.      Motor: No weakness.      Gait: Gait normal.   Psychiatric:         Mood and Affect: Mood normal.         Behavior: Behavior normal.         Thought Content: Thought content normal.         Judgment: Judgment normal.               Result Review :              Assessment and Plan    Diagnoses and all orders for this visit:    1. Allergic rhinitis, unspecified seasonality, unspecified trigger (Primary)  -     fluticasone (FLONASE) 50 MCG/ACT nasal spray; 2 sprays into the nostril(s) as directed by provider Daily for 30 days.  Dispense: 16 g; Refill: 2    Going to have him start doing daily nasal rinses and also use fluticasone nasal spray.  We will see if this improves his symptoms along with his antihistamines and plan to see him back in 2 months for follow-up.       Follow Up   Return in about 8 weeks (around 11/25/2021).  Patient was given instructions and counseling regarding his condition or for health maintenance advice. Please see specific information pulled into the AVS if appropriate.      DEZ Cruz

## 2021-09-30 NOTE — PATIENT INSTRUCTIONS
Neilmed Saline Nasal Rinse  http://[email or web address]/usa/directions-for-use.php        Step 1  Please wash your hands. Fill the clean bottle with the designated volume of either distilled, micro-filtered (through 0.2 micron filter), commercially bottled or previously boiled and cooled down water. Always rinse your nasal passages with NeilMed® Sinus Rinse™ packets only. Our packets contain a mixture of USP grade sodium chloride and sodium bicarbonate. These ingredients are of the purest quality available to make the dry powder mixture. Rinsing your nasal passages with only plain water without our mixture will result in a severe burning sensation as the plain water is not physiologic for your nasal lining, even if it is appropriate for drinking. Additionally, for your safety, do not use tap or faucet water for dissolving the mixture unless it has been previously boiled for five minutes or more as boiling sterilizes the water. Other choices are distilled, micro-filtered (through 0.2 micron), commercially bottled or, as mentioned earlier, previously boiled water at lukewarm or body temperature. You can store boiled water in a clean container for seven days or more if refrigerated. Do not use non-chlorinated or non-ultra (0.2 micron) filtered well water unless it is boiled and then cooled to lukewarm or body temperature.  *You may warm the water in a microwave in increments of 5 to 10 seconds to avoid overheating the water, damaging the device or scalding your nasal passage.   Step 2  Cut the Sinus Rinse™ mixture packet at the corner and pour its contents into the bottle. Tighten the cap and tube on the bottle securely. Place one finger over the tip of the cap and shake the bottle gently to dissolve the mixture.   Step 3  Standing in front of a sink, bend forward to your comfort level and tilt your head down. Keeping your mouth open, without holding your breath, place the cap snugly against your nasal passage. SQUEEZE  BOTTLE GENTLY until the solution starts draining from the OPPOSITE nasal passage. Some may drain from your mouth. For a proper rinse, keep squeezing the bottle GENTLY until at least 1/4 to 1/2 (60 mL to 120 mL or 2 to 4 fl oz) of the bottle is used. Do not swallow the solution.   Step 4  Blow your nose very gently, without pinching nose completely to avoid pressure on eardrums. If tolerable, sniff in gently any residual solution remaining in the nasal passage once or twice, because this may clean out the posterior nasopharyngeal area, which is the area at the back of your nasal passage. At times, some solution will reach the back of your throat, so please spit it out. To help drain any residual solution, blow your nose gently while tilting your head forward and to the opposite side of the nasal passage you just rinsed.  Step 5  Now repeat steps 3 and 4 for your other nasal passage. Most users fi nd that rinsing twice a day is beneficial, similar to brushing your teeth. Many doctors recommend rinsing 3-4 times daily or for special circumstances, even rinsing up to 6 times a day is safe. Please follow your physician’s advice.

## 2021-10-01 ENCOUNTER — OFFICE VISIT (OUTPATIENT)
Dept: UROLOGY | Facility: CLINIC | Age: 31
End: 2021-10-01

## 2021-10-01 VITALS — BODY MASS INDEX: 28.34 KG/M2 | RESPIRATION RATE: 17 BRPM | WEIGHT: 166 LBS | HEIGHT: 64 IN

## 2021-10-01 DIAGNOSIS — Z30.2 STERILIZATION: Primary | ICD-10-CM

## 2021-10-01 PROCEDURE — 55250 REMOVAL OF SPERM DUCT(S): CPT | Performed by: UROLOGY

## 2021-10-01 NOTE — PROGRESS NOTES
Vasectomy Procedure    Procedure: Vasectomy     Pre-procedure Diagnosis: Undesired Fertility    Post-procedure Diagnosis: Same    Surgeon: Michi Le MD    Anesthesia: Local, 10 cc of 1% Lidocaine    Indications  with undesired fertility, who presents today for vasectomy for permanent contraception.     Procedure Details:     The patient was appropriately identified, brought into the procedure room, and placed supine on the procedure table. A time was undertaken documenting the correct patient, site and procedure. His scrotum was prepped and draped in the standard sterile fashion. We first approached the right vas deferens. This was identified,  from the spermatic cord structures, and brought to the anterolateral aspect of the hemiscrotum. The local anaesthetic solution was injected and once an adequate level of local anesthesia was achieved, a scalpel was used to make a 1 cm incision in the skin overlying the vas deferens at the midline. A sharp hemostat was used to dissect the level of the vas deferens and on both of its sides, allowing for ring forceps to be introduced and grasp the vas deferens and externalize it through the skin incision. We then used a combination of sharp and blunt dissection to release the exposed vasal segment from all surrounding tissues. An approximately 1 cm piece of vas deferens was then sharply excised and removed. One blade of a sharp hemostat was used to probe each end of the severed vas deferens, confirming the presence of a vasal lumen. Electrocautery was then used to fulgurate both cut surfaces of the severed vas deferens. The abdominal side of the vas deferens was then placed underneath some perivasal tissues that were closed above it, using a figure-of-eight 3-0 chromic stitch, thus placing the two edges of the severed vas deferens in different tissue planes. Hemostasis was confirmed and the severed vas deferens was allowed to drop back into the scrotum.  We then  turned our attention to the left vas deferens. This was also identified and brought under the same midline incision. Local anesthetic was then delivered on this side around the vas deferens. An identical procedure was then carried out on the left vas deferens. Wound was dressed with bacitracin ointment and folded 4x4 gauze. The patient tolerated the procedure well and there were no intraoperative or immediate postoperative complications.     No intraprocedural complications    Blood loss 000    Plan:    1. Continue contraception until negative sperm analysis. Semen count in 10 weeks  2. Warning signs of infection were reviewed.   3. Patient is taken home by  with written home care instructions.  • Bedrest X 48 hrs, Ice pack every 3 hours for 24 hrs.    • Call the clinic if excessive pain, bleeding or swelling.  • Light duty for 2 weeks    Patient voiced understanding.    Electronically Signed by Michi Le MD on 10/01/2021

## 2021-10-05 ENCOUNTER — TELEPHONE (OUTPATIENT)
Dept: UROLOGY | Facility: CLINIC | Age: 31
End: 2021-10-05

## 2021-10-05 NOTE — TELEPHONE ENCOUNTER
Patient called back and said the sheet is filled out already with what he would be doing at work, and Dr. Le would have to go through them and say yes or no to them.

## 2021-10-05 NOTE — TELEPHONE ENCOUNTER
Patient is faxing a form to fill out for him to return to work.  He has gone back to work 10/05, and is asking for it to be filled out asap.  Call when complete at [phone].

## 2021-10-06 NOTE — TELEPHONE ENCOUNTER
Patient called, and I told him that Ally said he can come to the office to  the note.  Patient said his wife will come get it.

## 2021-10-06 NOTE — TELEPHONE ENCOUNTER
Patient's wife picked up the paper, and nothing was changed regarding what Dr. Le thinks he should be able to do.  He asked if the blank sheet can be completed, with what he wants him today.  He said he is sure he isn't to lift over to pounds, and isn't to bend and squat.

## 2021-10-07 ENCOUNTER — TELEPHONE (OUTPATIENT)
Dept: UROLOGY | Facility: CLINIC | Age: 31
End: 2021-10-07

## 2021-10-07 DIAGNOSIS — L03.90 CELLULITIS, UNSPECIFIED CELLULITIS SITE: Primary | ICD-10-CM

## 2021-10-07 RX ORDER — SULFAMETHOXAZOLE AND TRIMETHOPRIM 800; 160 MG/1; MG/1
1 TABLET ORAL 2 TIMES DAILY
Qty: 20 TABLET | Refills: 0 | Status: SHIPPED | OUTPATIENT
Start: 2021-10-07 | End: 2021-10-17

## 2021-10-07 NOTE — TELEPHONE ENCOUNTER
Spoke with patient, he stated there is some redness and a little yellow directly on the incision site, I explained for him to get abx and start those. He denies fevers or chills, or any other symptoms.

## 2021-10-07 NOTE — TELEPHONE ENCOUNTER
----- Message from Michi Le MD sent at 10/7/2021  8:19 AM EDT -----  Regarding: abxs  Patient called in thinking he has an infection, called him and see how he is doing, I went ahead and prescribed him Bactrim DS p.o. twice daily x10 days.  Should already be at his pharmacy.  He should take this if he has any redness

## 2021-10-14 ENCOUNTER — TELEPHONE (OUTPATIENT)
Dept: UROLOGY | Facility: CLINIC | Age: 31
End: 2021-10-14

## 2021-10-14 NOTE — TELEPHONE ENCOUNTER
Patient on 7th day of antibiotics, and almost 2 weeks past vasectomy.  Still has swelling, pain and tenderness.  Incision has small hole, but looks like healing slowly.  Still red.  Not much drainage, if any.  He wants to know if this is normal and what to do.

## 2021-10-14 NOTE — TELEPHONE ENCOUNTER
Patient called and I relayed the following message, per Dr. Le:    Can be normal in some people.  As long as things seem to be getting better everything is okay.  If things are getting worse I would want to see him

## 2021-10-22 ENCOUNTER — OFFICE VISIT (OUTPATIENT)
Dept: UROLOGY | Facility: CLINIC | Age: 31
End: 2021-10-22

## 2021-10-22 VITALS — HEIGHT: 64 IN | WEIGHT: 168.4 LBS | BODY MASS INDEX: 28.75 KG/M2 | RESPIRATION RATE: 18 BRPM

## 2021-10-22 DIAGNOSIS — Z30.2 STERILIZATION: Primary | ICD-10-CM

## 2021-10-22 PROCEDURE — 99024 POSTOP FOLLOW-UP VISIT: CPT | Performed by: UROLOGY

## 2021-10-22 NOTE — PROGRESS NOTES
Chief Complaint    Urologic complaint    Subjective          Ian REESE Carrier presents to [facility]  History of Present Illness    31-year-old gentleman status post ectomy 10/1/2021.      Patient having some tenderness.    Comes in today as he is worried about this    Past History:  Medical History: has a past medical history of Allergic rhinitis, Anxiety, Vera's esophagus (01/21/2021), and GERD (gastroesophageal reflux disease).   Surgical History: has a past surgical history that includes Colonoscopy (2020); Esophagogastroduodenoscopy (2020); and Splenectomy, total.   Family History: family history includes Other in an other family member.   Social History: reports that he quit smoking about 5 years ago. His smoking use included cigarettes. He quit after 6.00 years of use. He has never used smokeless tobacco. He reports current alcohol use. He reports that he does not use drugs.  Allergies: Patient has no known allergies.       Current Outpatient Medications:   •  fluticasone (FLONASE) 50 MCG/ACT nasal spray, 2 sprays into the nostril(s) as directed by provider Daily for 30 days., Disp: 16 g, Rfl: 2  •  loratadine (Claritin) 10 MG tablet, Take 1 tablet by mouth Daily., Disp: 90 tablet, Rfl: 1  •  montelukast (Singulair) 10 MG tablet, Take 1 tablet by mouth Daily., Disp: , Rfl:   •  omeprazole (priLOSEC) 40 MG capsule, Take 1 capsule by mouth Daily., Disp: , Rfl:   •  sertraline (ZOLOFT) 50 MG tablet, Take 2 tablets by mouth Daily. (Patient taking differently: Take 50 mg by mouth Daily.), Disp: 90 tablet, Rfl: 1     Physical exam       Alert and orient x3  Well appearing, well developed, in no acute distress   Unlabored respirations  Nontender/nondistended    Patient with small incision just barely open on the front scrotum.  No signs of cellulitis with some good granulation tissue.    Grossly oriented to person, place and time, judgment is intact, normal mood and affect    Results for  orders placed or performed in visit on 09/23/21   POC Urinalysis Dipstick, Multipro    Specimen: Urine   Result Value Ref Range    Color Yellow Yellow, Straw, Dark Yellow, Alisa    Clarity, UA Clear Clear    Glucose, UA Negative Negative, 1000 mg/dL (3+) mg/dL    Bilirubin Negative Negative    Ketones, UA Negative Negative    Specific Gravity  1.020 1.005 - 1.030    Blood, UA Negative Negative    pH, Urine 6.5 5.0 - 8.0    Protein, POC Negative Negative mg/dL    Urobilinogen, UA Normal Normal    Nitrite, UA Negative Negative    Leukocytes Negative Negative        Objective     Vital Signs:   There were no vitals taken for this visit.             Assessment and Plan    Diagnoses and all orders for this visit:    1. Sterilization (Primary)      Patient incision looks good, patient given reassurance he will follow-up for his 10-week mass appointment.

## 2021-11-01 ENCOUNTER — TELEPHONE (OUTPATIENT)
Dept: FAMILY MEDICINE CLINIC | Facility: CLINIC | Age: 31
End: 2021-11-01

## 2021-11-01 DIAGNOSIS — J30.9 ALLERGIC RHINITIS, UNSPECIFIED SEASONALITY, UNSPECIFIED TRIGGER: Chronic | ICD-10-CM

## 2021-11-01 DIAGNOSIS — K21.9 GASTROESOPHAGEAL REFLUX DISEASE, UNSPECIFIED WHETHER ESOPHAGITIS PRESENT: Primary | ICD-10-CM

## 2021-11-01 NOTE — TELEPHONE ENCOUNTER
Caller: Ian Teixeira    Relationship: Self    Medication requested (name and dosage):     Requested Prescriptions:   Requested Prescriptions      No prescriptions requested or ordered in this encounter    loratadine (Claritin) 10 MG tablet  montelukast (Singulair) 10 MG tablet  omeprazole (priLOSEC) 40 MG capsule    Pharm[facility] where request should be sent: [id number] [place] [facility] [address] AT [facility] [phone] [facility] [phone] FX    Additional details provided by patient: PATIENT STATED THAT HE IS OUT OF MEDICATIONS    Best call back number: [phone]    Does the patient have less than a 3 day supply:  [] Yes  [x] No    Silvia ARANDA Rep   11/01/21 11:41 EDT

## 2021-11-02 RX ORDER — OMEPRAZOLE 40 MG/1
40 CAPSULE, DELAYED RELEASE ORAL DAILY
Qty: 90 CAPSULE | Refills: 0 | Status: SHIPPED | OUTPATIENT
Start: 2021-11-02 | End: 2022-01-20 | Stop reason: SDUPTHER

## 2021-11-02 RX ORDER — MONTELUKAST SODIUM 10 MG/1
10 TABLET ORAL DAILY
Qty: 90 TABLET | Refills: 0 | Status: SHIPPED | OUTPATIENT
Start: 2021-11-02 | End: 2022-01-20 | Stop reason: SDUPTHER

## 2021-11-02 RX ORDER — LORATADINE 10 MG/1
10 TABLET ORAL DAILY
Qty: 90 TABLET | Refills: 0 | Status: SHIPPED | OUTPATIENT
Start: 2021-11-02 | End: 2021-12-06 | Stop reason: SDUPTHER

## 2021-12-06 ENCOUNTER — OFFICE VISIT (OUTPATIENT)
Dept: OTOLARYNGOLOGY | Facility: CLINIC | Age: 31
End: 2021-12-06

## 2021-12-06 VITALS — WEIGHT: 168.2 LBS | HEIGHT: 64 IN | TEMPERATURE: 97.3 F | BODY MASS INDEX: 28.71 KG/M2

## 2021-12-06 DIAGNOSIS — J30.9 ALLERGIC RHINITIS, UNSPECIFIED SEASONALITY, UNSPECIFIED TRIGGER: Primary | ICD-10-CM

## 2021-12-06 PROCEDURE — 99213 OFFICE O/P EST LOW 20 MIN: CPT | Performed by: NURSE PRACTITIONER

## 2021-12-06 RX ORDER — LORATADINE 10 MG/1
10 TABLET ORAL DAILY
Qty: 90 TABLET | Refills: 3 | Status: SHIPPED | OUTPATIENT
Start: 2021-12-06 | End: 2022-01-20 | Stop reason: SDUPTHER

## 2021-12-06 RX ORDER — FLUTICASONE PROPIONATE 50 MCG
2 SPRAY, SUSPENSION (ML) NASAL DAILY
Qty: 16 G | Refills: 5 | Status: SHIPPED | OUTPATIENT
Start: 2021-12-06 | End: 2022-07-15

## 2021-12-06 RX ORDER — AZELASTINE 1 MG/ML
2 SPRAY, METERED NASAL 2 TIMES DAILY
Qty: 30 ML | Refills: 5 | Status: SHIPPED | OUTPATIENT
Start: 2021-12-06 | End: 2022-07-15

## 2021-12-06 NOTE — PROGRESS NOTES
Patient Name: Ian Teixeira   Visit Date: 2021   Patient ID: [id number]  Provider: DEZ Cruz    Sex: male  Location: [facility]   YOB: 1990  Location Address: [address] [place] [address], [address],?[place]?[id number]    Primary Care Provider Monica Crump PA  Location Phone: [phone]    Referring Provider: No ref. provider found        Chief Complaint  Other (8 week f/u ) and Allergic Rhinitis    Subjective          Ian Teixeira is a 31 y.o. male who presents to [facility] for a follow-up visit of allergic rhinitis.  Patient was last seen 8 weeks ago at which time I started him on a regimen of daily nasal rinses and fluticasone spray.  He states that some days are better than others.  He does seem some improvement with the rinses and sprays but states that he has not been using the fluticasone spray every day.  He requests a refill on his loratadine.  He is continuing to have weekly allergy injections at [facility].      Current Outpatient Medications on File Prior to Visit   Medication Sig   • montelukast (Singulair) 10 MG tablet Take 1 tablet by mouth Daily.   • omeprazole (priLOSEC) 40 MG capsule Take 1 capsule by mouth Daily.   • sertraline (ZOLOFT) 50 MG tablet Take 2 tablets by mouth Daily. (Patient taking differently: Take 50 mg by mouth Daily.)   • [DISCONTINUED] loratadine (Claritin) 10 MG tablet Take 1 tablet by mouth Daily.   • [DISCONTINUED] fluticasone (FLONASE) 50 MCG/ACT nasal spray 2 sprays into the nostril(s) as directed by provider Daily for 30 days.     No current facility-administered medications on file prior to visit.        Social History     Tobacco Use   • Smoking status: Former Smoker     Years: 6.00     Types: Cigarettes     Quit date: 2016     Years since quittin.9   • Smokeless tobacco: Never Used   Vaping Use   • Vaping Use: Former   • Substances: CBD   Substance Use Topics   •  "Alcohol use: Yes     Comment: DRINKS WEEKLY, 2 DRINKS PER DAYS, HAS BEEN DRINKING FOR 6-10 YEARS   • Drug use: Never        Objective     Vital Signs:   Temp 97.3 °F (36.3 °C)   Ht 162.6 cm (64\")   Wt 76.3 kg (168 lb 3.2 oz)   BMI 28.87 kg/m²       Physical Exam  Constitutional:       General: He is not in acute distress.     Appearance: Normal appearance. He is not ill-appearing.   HENT:      Head: Normocephalic and atraumatic.      Jaw: There is normal jaw occlusion. No tenderness or pain on movement.      Salivary Glands: Right salivary gland is not diffusely enlarged or tender. Left salivary gland is not diffusely enlarged or tender.      Right Ear: Tympanic membrane, ear canal and external ear normal.      Left Ear: Tympanic membrane, ear canal and external ear normal.      Nose: Rhinorrhea present. No septal deviation. Rhinorrhea is clear.      Right Sinus: No maxillary sinus tenderness or frontal sinus tenderness.      Left Sinus: No maxillary sinus tenderness or frontal sinus tenderness.      Mouth/Throat:      Lips: Pink. No lesions.      Mouth: Mucous membranes are moist. No oral lesions.      Dentition: Normal dentition.      Tongue: No lesions.      Palate: No mass and lesions.      Pharynx: Oropharynx is clear. Uvula midline.      Tonsils: No tonsillar exudate.   Eyes:      Extraocular Movements: Extraocular movements intact.      Conjunctiva/sclera: Conjunctivae normal.      Pupils: Pupils are equal, round, and reactive to light.   Neck:      Thyroid: No thyroid mass, thyromegaly or thyroid tenderness.      Trachea: Trachea normal.   Pulmonary:      Effort: Pulmonary effort is normal. No respiratory distress.   Musculoskeletal:         General: Normal range of motion.      Cervical back: Normal range of motion and neck supple. No tenderness.   Lymphadenopathy:      Cervical: No cervical adenopathy.   Skin:     General: Skin is warm and dry.   Neurological:      General: No focal deficit present.     "  Mental Status: He is alert and oriented to person, place, and time.      Cranial Nerves: No cranial nerve deficit.      Motor: No weakness.      Gait: Gait normal.   Psychiatric:         Mood and Affect: Mood normal.         Behavior: Behavior normal.         Thought Content: Thought content normal.         Judgment: Judgment normal.                Result Review :               Assessment and Plan    Diagnoses and all orders for this visit:    1. Allergic rhinitis, unspecified seasonality, unspecified trigger (Primary)  -     azelastine (ASTELIN) 0.1 % nasal spray; 2 sprays into the nostril(s) as directed by provider 2 (Two) Times a Day. Use in each nostril as directed  Dispense: 30 mL; Refill: 5  -     fluticasone (FLONASE) 50 MCG/ACT nasal spray; 2 sprays into the nostril(s) as directed by provider Daily for 30 days.  Dispense: 16 g; Refill: 5  -     loratadine (Claritin) 10 MG tablet; Take 1 tablet by mouth Daily.  Dispense: 90 tablet; Refill: 3           Follow Up   Return if symptoms worsen or fail to improve.  Patient was given instructions and counseling regarding his condition or for health maintenance advice. Please see specific information pulled into the AVS if appropriate.     DEZ Cruz

## 2021-12-10 ENCOUNTER — OFFICE VISIT (OUTPATIENT)
Dept: UROLOGY | Facility: CLINIC | Age: 31
End: 2021-12-10

## 2021-12-10 DIAGNOSIS — Z30.2 STERILIZATION: Primary | ICD-10-CM

## 2021-12-10 PROCEDURE — 99024 POSTOP FOLLOW-UP VISIT: CPT | Performed by: UROLOGY

## 2021-12-10 NOTE — PROGRESS NOTES
Subjective         History of Present Illness:  Ian Teixeira is a 31 y.o. male who is here status post vasectomy. 0 sperm noted on a #20 power fields.         Assessment and Plan     Undesired fertility    Medications  • Medications have been reconciled.       Instructions    [x]   Instructed patient to follow-up as needed, counseled that he is okay to stop using birth control.    []   Patient to follow-up in 1 month for recheck, patient counseled to continue use birth control as he is still considered fertile and able to father children until recheck and given the okay to stop using birth control at that time. Patient voiced understanding.           Michi Le MD

## 2021-12-14 ENCOUNTER — TELEPHONE (OUTPATIENT)
Dept: FAMILY MEDICINE CLINIC | Facility: CLINIC | Age: 31
End: 2021-12-14

## 2021-12-14 NOTE — TELEPHONE ENCOUNTER
Caller: Ian Teixeira    Relationship to patient: Self    Best call back number: [phone]          Type of visit: OFFICE     Requested date: WEEK OF   12/20 /21      If rescheduling, when is the original appointment:  1/20/22     Additional notes:    PATIENT IS WANTING TO KNOW IF PCP CAN SCHEDULE HIM AN APPOINTMENT SOONER THAN HIS APPOINTMENT DATE ON 1/20/22.    PLEASE CALL AND ADVISE

## 2021-12-28 DIAGNOSIS — F41.9 ANXIETY: Chronic | ICD-10-CM

## 2022-01-20 ENCOUNTER — OFFICE VISIT (OUTPATIENT)
Dept: FAMILY MEDICINE CLINIC | Facility: CLINIC | Age: 32
End: 2022-01-20

## 2022-01-20 VITALS
SYSTOLIC BLOOD PRESSURE: 116 MMHG | BODY MASS INDEX: 28.75 KG/M2 | DIASTOLIC BLOOD PRESSURE: 85 MMHG | TEMPERATURE: 97.6 F | HEIGHT: 64 IN | WEIGHT: 168.4 LBS | OXYGEN SATURATION: 98 % | HEART RATE: 63 BPM

## 2022-01-20 DIAGNOSIS — J30.9 ALLERGIC RHINITIS, UNSPECIFIED SEASONALITY, UNSPECIFIED TRIGGER: Chronic | ICD-10-CM

## 2022-01-20 DIAGNOSIS — K21.9 GASTROESOPHAGEAL REFLUX DISEASE, UNSPECIFIED WHETHER ESOPHAGITIS PRESENT: Chronic | ICD-10-CM

## 2022-01-20 DIAGNOSIS — F41.9 ANXIETY: Chronic | ICD-10-CM

## 2022-01-20 PROBLEM — Z30.09 STERILIZATION CONSULT: Status: RESOLVED | Noted: 2021-08-12 | Resolved: 2022-01-20

## 2022-01-20 PROBLEM — Z30.2 STERILIZATION: Status: RESOLVED | Noted: 2021-10-01 | Resolved: 2022-01-20

## 2022-01-20 PROCEDURE — 99214 OFFICE O/P EST MOD 30 MIN: CPT | Performed by: PHYSICIAN ASSISTANT

## 2022-01-20 RX ORDER — OMEPRAZOLE 40 MG/1
40 CAPSULE, DELAYED RELEASE ORAL DAILY
Qty: 90 CAPSULE | Refills: 0 | Status: SHIPPED | OUTPATIENT
Start: 2022-01-20 | End: 2022-05-13

## 2022-01-20 RX ORDER — LORATADINE 10 MG/1
10 TABLET ORAL DAILY
Qty: 90 TABLET | Refills: 1 | Status: SHIPPED | OUTPATIENT
Start: 2022-01-20 | End: 2022-05-13 | Stop reason: SDUPTHER

## 2022-01-20 RX ORDER — MONTELUKAST SODIUM 10 MG/1
10 TABLET ORAL DAILY
Qty: 90 TABLET | Refills: 0 | Status: SHIPPED | OUTPATIENT
Start: 2022-01-20 | End: 2022-04-18 | Stop reason: SDUPTHER

## 2022-01-20 NOTE — PROGRESS NOTES
Chief Complaint  Chief Complaint   Patient presents with   • Anxiety     6 month follow up   • Heartburn   • Arthritis       Subjective          Ian REESE Carrier presents to [facility]  History of Present Illness     6 month follow up    Patient is requesting a refill on Loratadine today.     Anxiety: Patient is currently on Sertraline for anxiety and doing well. Patient states that symptoms are well controlled.    GERD: Patient is currently on Omeprazole for the treatment of GERD. Patient is doing well without breakthrough symptoms. Patient has recently tried to discontinue medication and symptoms of reflux returned.    Allergic Rhinitis: Patient presents for management of Allergic Rhinitis. Patient is currently on Loratadine, Fluticasone and Azelastine. Symptoms are not well controlled and wants to be retested. Patient is getting allergy injections with Dr. Shine and is still having symptoms.      Labs: 07/15/21    Colon: 01/28/2020    Flu: vaccinated    Covid: Given 1st dose last week      Medical History: has a past medical history of Allergic rhinitis, Anxiety, Vera's esophagus (01/21/2021), and GERD (gastroesophageal reflux disease).   Surgical History: has a past surgical history that includes Colonoscopy (2020); Esophagogastroduodenoscopy (2020); Splenectomy, total; and Vasectomy (10/2021).   Family History: family history includes Other in an other family member.   Social History: reports that he quit smoking about 6 years ago. His smoking use included cigarettes. He quit after 6.00 years of use. He has never used smokeless tobacco. He reports current alcohol use. He reports that he does not use drugs.    Allergies: Patient has no known allergies.    Health Maintenance Due   Topic Date Due   • ANNUAL PHYSICAL  Never done   • TDAP/TD VACCINES (1 - Tdap) Never done       Immunization History   Administered Date(s) Administered   • Covid-19 (Pfizer) Gray Cap 01/14/2022   • Flu  "Vaccine Quad PF >18YRS 01/17/2018, 11/07/2018, 10/21/2019, 09/23/2020   • Hep B, Adolescent or Pediatric 01/25/2015   • Hepatitis B 11/07/2018   • Hib (PRP-T) 01/25/2015   • Influenza, Unspecified 01/17/2018, 11/07/2018, 10/01/2020, 01/14/2022   • Meningococcal Conjugate 01/25/2015   • Pneumococcal Conjugate 13-Valent (PCV13) 01/25/2015   • Pneumococcal Polysaccharide (PPSV23) 01/25/2015, 12/06/2019       Objective     Vitals:    01/20/22 0858   BP: 116/85   BP Location: Right arm   Patient Position: Sitting   Pulse: 63   Temp: 97.6 °F (36.4 °C)   SpO2: 98%   Weight: 76.4 kg (168 lb 6.4 oz)   Height: 162.6 cm (64\")     Body mass index is 28.91 kg/m².       Physical Exam  Vitals and nursing note reviewed.   Constitutional:       Appearance: Normal appearance.   HENT:      Head: Normocephalic and atraumatic.   Neck:      Vascular: No carotid bruit.      Comments: Thyroid : gland size normal, nontender, no nodules or masses present on palpation   Cardiovascular:      Rate and Rhythm: Normal rate and regular rhythm.      Pulses: Normal pulses.      Heart sounds: Normal heart sounds.   Pulmonary:      Effort: Pulmonary effort is normal.      Breath sounds: Normal breath sounds.   Musculoskeletal:      Cervical back: Neck supple. No tenderness.      Right lower leg: No edema.      Left lower leg: No edema.   Lymphadenopathy:      Cervical: No cervical adenopathy.   Neurological:      Mental Status: He is alert.   Psychiatric:         Mood and Affect: Mood normal.         Behavior: Behavior normal.           Result Review :                           Assessment and Plan      Diagnoses and all orders for this visit:    1. Allergic rhinitis, unspecified seasonality, unspecified trigger  Comments:  Worsening: continue with Singulair 10mg daily and Claritin 10mg daily; discussed follow up with allergist.  Orders:  -     loratadine (Claritin) 10 MG tablet; Take 1 tablet by mouth Daily.  Dispense: 90 tablet; Refill: 1  -     " montelukast (Singulair) 10 MG tablet; Take 1 tablet by mouth Daily.  Dispense: 90 tablet; Refill: 0    2. Anxiety  Comments:  Stable on Zoloft 50mg daily; continue and follow up in 6mths.  Orders:  -     sertraline (ZOLOFT) 50 MG tablet; Take 1 tablet by mouth Daily.  Dispense: 90 tablet; Refill: 0    3. Gastroesophageal reflux disease, unspecified whether esophagitis present  Comments:  Stable on Omeprazole 40mg daily; continue and follow up in 6mths.  Orders:  -     omeprazole (priLOSEC) 40 MG capsule; Take 1 capsule by mouth Daily.  Dispense: 90 capsule; Refill: 0            Follow Up     Return in about 6 months (around 7/20/2022).    Patient was given instructions and counseling regarding his condition or for health maintenance advice. Please see specific information pulled into the AVS if appropriate.

## 2022-02-21 DIAGNOSIS — F41.9 ANXIETY: Chronic | ICD-10-CM

## 2022-02-21 RX ORDER — SERTRALINE HYDROCHLORIDE 100 MG/1
100 TABLET, FILM COATED ORAL DAILY
Qty: 90 TABLET | Refills: 0 | Status: SHIPPED | OUTPATIENT
Start: 2022-02-21 | End: 2022-05-13 | Stop reason: SDUPTHER

## 2022-02-21 NOTE — TELEPHONE ENCOUNTER
Per SUSAN Hartman patient states that he is taking TWO 50mg tablets daily therefore I changed dose to 100mg daily.

## 2022-02-21 NOTE — TELEPHONE ENCOUNTER
Patient called requesting a refill on Zoloft. Patient states that the spoke with his PCP (Monica) about taking his medication twice a day, because he has been taking it twice a day he is out of his medication.

## 2022-04-18 DIAGNOSIS — J30.9 ALLERGIC RHINITIS, UNSPECIFIED SEASONALITY, UNSPECIFIED TRIGGER: Chronic | ICD-10-CM

## 2022-04-18 RX ORDER — MONTELUKAST SODIUM 10 MG/1
10 TABLET ORAL DAILY
Qty: 90 TABLET | Refills: 0 | Status: SHIPPED | OUTPATIENT
Start: 2022-04-18 | End: 2022-06-19

## 2022-05-13 DIAGNOSIS — F41.9 ANXIETY: Chronic | ICD-10-CM

## 2022-05-13 DIAGNOSIS — J30.9 ALLERGIC RHINITIS, UNSPECIFIED SEASONALITY, UNSPECIFIED TRIGGER: Chronic | ICD-10-CM

## 2022-05-13 DIAGNOSIS — K21.9 GASTROESOPHAGEAL REFLUX DISEASE, UNSPECIFIED WHETHER ESOPHAGITIS PRESENT: Chronic | ICD-10-CM

## 2022-05-13 RX ORDER — LORATADINE 10 MG/1
10 TABLET ORAL DAILY
Qty: 90 TABLET | Refills: 1 | Status: SHIPPED | OUTPATIENT
Start: 2022-05-13 | End: 2022-07-15 | Stop reason: SDUPTHER

## 2022-05-13 RX ORDER — OMEPRAZOLE 40 MG/1
40 CAPSULE, DELAYED RELEASE ORAL DAILY
Qty: 90 CAPSULE | Refills: 0 | OUTPATIENT
Start: 2022-05-13

## 2022-05-13 RX ORDER — SERTRALINE HYDROCHLORIDE 100 MG/1
100 TABLET, FILM COATED ORAL DAILY
Qty: 90 TABLET | Refills: 0 | Status: SHIPPED | OUTPATIENT
Start: 2022-05-13 | End: 2022-07-15 | Stop reason: SDUPTHER

## 2022-05-13 RX ORDER — OMEPRAZOLE 40 MG/1
40 CAPSULE, DELAYED RELEASE ORAL DAILY
Qty: 90 CAPSULE | Refills: 0 | Status: SHIPPED | OUTPATIENT
Start: 2022-05-13 | End: 2022-07-15 | Stop reason: SDUPTHER

## 2022-05-13 NOTE — TELEPHONE ENCOUNTER
Caller: Ian Teixeira    Relationship: Self    Best call back number: [phone]    Requested Prescriptions:   Requested Prescriptions     Pending Prescriptions Disp Refills   • loratadine (Claritin) 10 MG tablet 90 tablet 1     Sig: Take 1 tablet by mouth Daily.   • sertraline (ZOLOFT) 100 MG tablet 90 tablet 0     Sig: Take 1 tablet by mouth Daily.   • omeprazole (priLOSEC) 40 MG capsule 90 capsule 0     Sig: Take 1 capsule by mouth Daily.        Pharmacy where request should be sent: [id number] - [place], [place] - [address] AT [address] [phone] [facility] [phone] FX         Does the patient have less than a 3 day supply:  [x] Yes  [] No    Silvia Webber Rep   05/13/22 10:51 EDT

## 2022-06-17 DIAGNOSIS — J30.9 ALLERGIC RHINITIS, UNSPECIFIED SEASONALITY, UNSPECIFIED TRIGGER: Chronic | ICD-10-CM

## 2022-06-19 RX ORDER — MONTELUKAST SODIUM 10 MG/1
10 TABLET ORAL DAILY
Qty: 90 TABLET | Refills: 0 | Status: SHIPPED | OUTPATIENT
Start: 2022-06-19 | End: 2022-07-15 | Stop reason: SDUPTHER

## 2022-07-15 ENCOUNTER — OFFICE VISIT (OUTPATIENT)
Dept: FAMILY MEDICINE CLINIC | Facility: CLINIC | Age: 32
End: 2022-07-15

## 2022-07-15 ENCOUNTER — LAB (OUTPATIENT)
Dept: LAB | Facility: HOSPITAL | Age: 32
End: 2022-07-15

## 2022-07-15 VITALS
HEIGHT: 64 IN | HEART RATE: 59 BPM | OXYGEN SATURATION: 100 % | DIASTOLIC BLOOD PRESSURE: 72 MMHG | BODY MASS INDEX: 28.85 KG/M2 | SYSTOLIC BLOOD PRESSURE: 123 MMHG | WEIGHT: 169 LBS

## 2022-07-15 DIAGNOSIS — F41.9 ANXIETY: Chronic | ICD-10-CM

## 2022-07-15 DIAGNOSIS — J30.9 ALLERGIC RHINITIS, UNSPECIFIED SEASONALITY, UNSPECIFIED TRIGGER: Chronic | ICD-10-CM

## 2022-07-15 DIAGNOSIS — Z13.220 SCREENING FOR LIPID DISORDERS: Primary | ICD-10-CM

## 2022-07-15 DIAGNOSIS — J30.9 ALLERGIC RHINITIS, UNSPECIFIED SEASONALITY, UNSPECIFIED TRIGGER: ICD-10-CM

## 2022-07-15 DIAGNOSIS — Z13.220 SCREENING FOR LIPID DISORDERS: ICD-10-CM

## 2022-07-15 DIAGNOSIS — K21.9 GASTROESOPHAGEAL REFLUX DISEASE, UNSPECIFIED WHETHER ESOPHAGITIS PRESENT: Chronic | ICD-10-CM

## 2022-07-15 LAB
ALBUMIN SERPL-MCNC: 4.6 G/DL (ref 3.5–5.2)
ALBUMIN/GLOB SERPL: 1.8 G/DL
ALP SERPL-CCNC: 67 U/L (ref 39–117)
ALT SERPL W P-5'-P-CCNC: 24 U/L (ref 1–41)
ANION GAP SERPL CALCULATED.3IONS-SCNC: 11.5 MMOL/L (ref 5–15)
AST SERPL-CCNC: 27 U/L (ref 1–40)
BASOPHILS # BLD AUTO: 0.09 10*3/MM3 (ref 0–0.2)
BASOPHILS NFR BLD AUTO: 1.3 % (ref 0–1.5)
BILIRUB SERPL-MCNC: 0.4 MG/DL (ref 0–1.2)
BUN SERPL-MCNC: 19 MG/DL (ref 6–20)
BUN/CREAT SERPL: 15.4 (ref 7–25)
CALCIUM SPEC-SCNC: 9.2 MG/DL (ref 8.6–10.5)
CHLORIDE SERPL-SCNC: 104 MMOL/L (ref 98–107)
CHOLEST SERPL-MCNC: 194 MG/DL (ref 0–200)
CO2 SERPL-SCNC: 23.5 MMOL/L (ref 22–29)
CREAT SERPL-MCNC: 1.23 MG/DL (ref 0.76–1.27)
DEPRECATED RDW RBC AUTO: 40.4 FL (ref 37–54)
EGFRCR SERPLBLD CKD-EPI 2021: 80 ML/MIN/1.73
EOSINOPHIL # BLD AUTO: 0.09 10*3/MM3 (ref 0–0.4)
EOSINOPHIL NFR BLD AUTO: 1.3 % (ref 0.3–6.2)
ERYTHROCYTE [DISTWIDTH] IN BLOOD BY AUTOMATED COUNT: 12.6 % (ref 12.3–15.4)
GLOBULIN UR ELPH-MCNC: 2.5 GM/DL
GLUCOSE SERPL-MCNC: 77 MG/DL (ref 65–99)
HCT VFR BLD AUTO: 45.1 % (ref 37.5–51)
HDLC SERPL-MCNC: 54 MG/DL (ref 40–60)
HGB BLD-MCNC: 15.2 G/DL (ref 13–17.7)
IMM GRANULOCYTES # BLD AUTO: 0.01 10*3/MM3 (ref 0–0.05)
IMM GRANULOCYTES NFR BLD AUTO: 0.1 % (ref 0–0.5)
LDLC SERPL CALC-MCNC: 131 MG/DL (ref 0–100)
LDLC/HDLC SERPL: 2.42 {RATIO}
LYMPHOCYTES # BLD AUTO: 2.68 10*3/MM3 (ref 0.7–3.1)
LYMPHOCYTES NFR BLD AUTO: 40.1 % (ref 19.6–45.3)
MCH RBC QN AUTO: 29.7 PG (ref 26.6–33)
MCHC RBC AUTO-ENTMCNC: 33.7 G/DL (ref 31.5–35.7)
MCV RBC AUTO: 88.3 FL (ref 79–97)
MONOCYTES # BLD AUTO: 0.65 10*3/MM3 (ref 0.1–0.9)
MONOCYTES NFR BLD AUTO: 9.7 % (ref 5–12)
NEUTROPHILS NFR BLD AUTO: 3.17 10*3/MM3 (ref 1.7–7)
NEUTROPHILS NFR BLD AUTO: 47.5 % (ref 42.7–76)
NRBC BLD AUTO-RTO: 0 /100 WBC (ref 0–0.2)
PLATELET # BLD AUTO: 415 10*3/MM3 (ref 140–450)
PMV BLD AUTO: 9.8 FL (ref 6–12)
POTASSIUM SERPL-SCNC: 4.5 MMOL/L (ref 3.5–5.2)
PROT SERPL-MCNC: 7.1 G/DL (ref 6–8.5)
RBC # BLD AUTO: 5.11 10*6/MM3 (ref 4.14–5.8)
SODIUM SERPL-SCNC: 139 MMOL/L (ref 136–145)
TRIGL SERPL-MCNC: 46 MG/DL (ref 0–150)
TSH SERPL DL<=0.05 MIU/L-ACNC: 0.87 UIU/ML (ref 0.27–4.2)
VLDLC SERPL-MCNC: 9 MG/DL (ref 5–40)
WBC NRBC COR # BLD: 6.69 10*3/MM3 (ref 3.4–10.8)

## 2022-07-15 PROCEDURE — 99214 OFFICE O/P EST MOD 30 MIN: CPT | Performed by: PHYSICIAN ASSISTANT

## 2022-07-15 PROCEDURE — 84443 ASSAY THYROID STIM HORMONE: CPT

## 2022-07-15 PROCEDURE — 85025 COMPLETE CBC W/AUTO DIFF WBC: CPT

## 2022-07-15 PROCEDURE — 36415 COLL VENOUS BLD VENIPUNCTURE: CPT

## 2022-07-15 PROCEDURE — 80053 COMPREHEN METABOLIC PANEL: CPT

## 2022-07-15 PROCEDURE — 80061 LIPID PANEL: CPT

## 2022-07-15 RX ORDER — SERTRALINE HYDROCHLORIDE 100 MG/1
100 TABLET, FILM COATED ORAL DAILY
Qty: 90 TABLET | Refills: 1 | Status: SHIPPED | OUTPATIENT
Start: 2022-07-15 | End: 2022-12-01 | Stop reason: SDUPTHER

## 2022-07-15 RX ORDER — MONTELUKAST SODIUM 10 MG/1
10 TABLET ORAL DAILY
Qty: 90 TABLET | Refills: 1 | Status: SHIPPED | OUTPATIENT
Start: 2022-07-15 | End: 2022-12-01 | Stop reason: SDUPTHER

## 2022-07-15 RX ORDER — OMEPRAZOLE 40 MG/1
40 CAPSULE, DELAYED RELEASE ORAL DAILY
Qty: 90 CAPSULE | Refills: 1 | Status: SHIPPED | OUTPATIENT
Start: 2022-07-15 | End: 2022-12-01 | Stop reason: SDUPTHER

## 2022-07-15 RX ORDER — LORATADINE 10 MG/1
10 TABLET ORAL DAILY
Qty: 90 TABLET | Refills: 1 | Status: SHIPPED | OUTPATIENT
Start: 2022-07-15 | End: 2022-12-01 | Stop reason: ALTCHOICE

## 2022-07-15 NOTE — PROGRESS NOTES
Chief Complaint  Chief Complaint   Patient presents with   • Anxiety   • Allergies   • Heartburn   • Annual Exam       Subjective          Ian Teixeira presents to [facility]  History of Present Illness     Patient here today for 6 month follow up.     Anxiety: Patient reports does well with Zoloft 100 mg.     Allergies: Well controlled with Claritin and Singular      GERD: managed by Omeprazole 40 mg; occasionally breakthrough symptoms associated with certain foods.    Labs: 07/15/21      Medical History: has a past medical history of Allergic rhinitis, Anxiety, Vera's esophagus (01/21/2021), and GERD (gastroesophageal reflux disease).   Surgical History: has a past surgical history that includes Colonoscopy (2020); Esophagogastroduodenoscopy (2020); Splenectomy, total; and Vasectomy (10/2021).   Family History: family history includes Other in an other family member.   Social History: reports that he quit smoking about 6 years ago. His smoking use included cigarettes. He quit after 6.00 years of use. He has never used smokeless tobacco. He reports current alcohol use. He reports that he does not use drugs.    Allergies: Patient has no known allergies.    Health Maintenance Due   Topic Date Due   • ANNUAL PHYSICAL  Never done   • TDAP/TD VACCINES (1 - Tdap) Never done   • COVID-19 Vaccine (3 - Booster for Pfizer series) 07/11/2022       Immunization History   Administered Date(s) Administered   • Covid-19 (Pfizer) Gray Cap 01/14/2022, 02/11/2022   • Fluzone Split Quad (Multi-dose) 01/17/2018, 11/07/2018, 10/21/2019, 09/23/2020   • Hep B, Adolescent or Pediatric 01/25/2015   • Hepatitis B 11/07/2018   • Hib (PRP-T) 01/25/2015   • Influenza, Unspecified 01/17/2018, 11/07/2018, 10/01/2020, 01/14/2022   • Meningococcal Conjugate 01/25/2015   • Pneumococcal Conjugate 13-Valent (PCV13) 01/25/2015   • Pneumococcal Polysaccharide (PPSV23) 01/25/2015, 12/06/2019       Objective  "    Vitals:    07/15/22 0840   BP: 123/72   Pulse: 59   SpO2: 100%   Weight: 76.7 kg (169 lb)   Height: 162.6 cm (64\")     Body mass index is 29.01 kg/m².     Wt Readings from Last 3 Encounters:   07/15/22 76.7 kg (169 lb)   01/29/22 76.5 kg (168 lb 10.4 oz)   01/27/22 76.5 kg (168 lb 9.6 oz)     BP Readings from Last 3 Encounters:   07/15/22 123/72   01/29/22 121/77   01/27/22 123/72       Patient Care Team:  Monica Crump PA as PCP - General (Family Medicine)    Physical Exam  Vitals and nursing note reviewed.   Constitutional:       Appearance: Normal appearance.   HENT:      Head: Normocephalic and atraumatic.   Neck:      Vascular: No carotid bruit.      Comments: Thyroid : gland size normal, nontender, no nodules or masses present on palpation   Cardiovascular:      Rate and Rhythm: Normal rate and regular rhythm.      Pulses: Normal pulses.      Heart sounds: Normal heart sounds.   Pulmonary:      Effort: Pulmonary effort is normal.      Breath sounds: Normal breath sounds.   Abdominal:      Palpations: Abdomen is soft.      Tenderness: There is no abdominal tenderness.   Musculoskeletal:      Cervical back: Neck supple. No tenderness.      Right lower leg: No edema.      Left lower leg: No edema.   Lymphadenopathy:      Cervical: No cervical adenopathy.   Neurological:      Mental Status: He is alert.   Psychiatric:         Mood and Affect: Mood normal.         Behavior: Behavior normal.           Result Review :                           Assessment and Plan      Diagnoses and all orders for this visit:    1. Screening for lipid disorders (Primary)  -     Comprehensive Metabolic Panel; Future  -     Lipid Panel; Future    2. Allergic rhinitis, unspecified seasonality, unspecified trigger  Comments:  Stable: continue with Singulair 10mg daily and Claritin 10mg daily; follow up in 6mths.  Orders:  -     loratadine (Claritin) 10 MG tablet; Take 1 tablet by mouth Daily.  Dispense: 90 tablet; Refill: " 1  -     montelukast (SINGULAIR) 10 MG tablet; Take 1 tablet by mouth Daily.  Dispense: 90 tablet; Refill: 1  -     CBC & Differential; Future    3. Gastroesophageal reflux disease, unspecified whether esophagitis present  Comments:  Stable on Omeprazole 40mg daily; continue and follow up in 6mths.  Orders:  -     omeprazole (priLOSEC) 40 MG capsule; Take 1 capsule by mouth Daily.  Dispense: 90 capsule; Refill: 1    4. Anxiety  Comments:  Stable on Zoloft 50mg daily; continue and follow up in 6mths.  Orders:  -     sertraline (ZOLOFT) 100 MG tablet; Take 1 tablet by mouth Daily.  Dispense: 90 tablet; Refill: 1  -     TSH; Future            Follow Up     Return in about 6 months (around 1/15/2023).    Patient was given instructions and counseling regarding his condition or for health maintenance advice. Please see specific information pulled into the AVS if appropriate.

## 2022-08-10 DIAGNOSIS — F41.9 ANXIETY: Chronic | ICD-10-CM

## 2022-08-10 RX ORDER — SERTRALINE HYDROCHLORIDE 100 MG/1
100 TABLET, FILM COATED ORAL DAILY
Qty: 90 TABLET | Refills: 1 | OUTPATIENT
Start: 2022-08-10

## 2022-09-24 PROCEDURE — U0004 COV-19 TEST NON-CDC HGH THRU: HCPCS | Performed by: NURSE PRACTITIONER

## 2022-11-04 ENCOUNTER — TELEPHONE (OUTPATIENT)
Dept: FAMILY MEDICINE CLINIC | Facility: CLINIC | Age: 32
End: 2022-11-04

## 2022-11-04 NOTE — TELEPHONE ENCOUNTER
Patient called and stated that he had experienced rectal bleeding with his last bowel movement. He stated that this was the first time that this has happened, and he stated that the blood was bright red. He denied any abdominal pain, dark stools, vomiting, dizziness or pain of any other kind. I let him know that he needed to monitor his symptoms, and that if the bleeding worsened, failed to stop in the next few days, or he started to have any of the symptoms listed above, that he should go immediately to the ER. Patient voiced understanding.

## 2022-11-18 ENCOUNTER — TELEPHONE (OUTPATIENT)
Dept: GASTROENTEROLOGY | Facility: CLINIC | Age: 32
End: 2022-11-18

## 2022-11-18 NOTE — TELEPHONE ENCOUNTER
I received a vm from Mr Teixeira stating he is out of his sucralfate Rx...    Mr Teixeira last OV was 05/04/21, was due to come back in 1 year. I do not see that Rx on his med list.    I called Mr Teixeira back and left vm stating he will need a OV appt. [email or web address]

## 2022-12-01 ENCOUNTER — OFFICE VISIT (OUTPATIENT)
Dept: FAMILY MEDICINE CLINIC | Facility: CLINIC | Age: 32
End: 2022-12-01

## 2022-12-01 VITALS
HEART RATE: 64 BPM | OXYGEN SATURATION: 98 % | SYSTOLIC BLOOD PRESSURE: 116 MMHG | BODY MASS INDEX: 30.11 KG/M2 | WEIGHT: 175.4 LBS | DIASTOLIC BLOOD PRESSURE: 85 MMHG

## 2022-12-01 DIAGNOSIS — Z23 NEED FOR INFLUENZA VACCINATION: Primary | ICD-10-CM

## 2022-12-01 DIAGNOSIS — F41.9 ANXIETY: Chronic | ICD-10-CM

## 2022-12-01 DIAGNOSIS — K21.9 GASTROESOPHAGEAL REFLUX DISEASE, UNSPECIFIED WHETHER ESOPHAGITIS PRESENT: Chronic | ICD-10-CM

## 2022-12-01 DIAGNOSIS — K64.8 INTERNAL HEMORRHOID: Chronic | ICD-10-CM

## 2022-12-01 DIAGNOSIS — J30.9 ALLERGIC RHINITIS, UNSPECIFIED SEASONALITY, UNSPECIFIED TRIGGER: Chronic | ICD-10-CM

## 2022-12-01 PROCEDURE — 90471 IMMUNIZATION ADMIN: CPT | Performed by: PHYSICIAN ASSISTANT

## 2022-12-01 PROCEDURE — 99214 OFFICE O/P EST MOD 30 MIN: CPT | Performed by: PHYSICIAN ASSISTANT

## 2022-12-01 PROCEDURE — 90686 IIV4 VACC NO PRSV 0.5 ML IM: CPT | Performed by: PHYSICIAN ASSISTANT

## 2022-12-01 RX ORDER — MONTELUKAST SODIUM 10 MG/1
10 TABLET ORAL DAILY
Qty: 90 TABLET | Refills: 1 | Status: SHIPPED | OUTPATIENT
Start: 2022-12-01

## 2022-12-01 RX ORDER — SERTRALINE HYDROCHLORIDE 100 MG/1
100 TABLET, FILM COATED ORAL DAILY
Qty: 90 TABLET | Refills: 1 | Status: SHIPPED | OUTPATIENT
Start: 2022-12-01

## 2022-12-01 RX ORDER — HYDROCORTISONE ACETATE 25 MG/1
25 SUPPOSITORY RECTAL 2 TIMES DAILY PRN
Qty: 24 EACH | Refills: 1 | Status: SHIPPED | OUTPATIENT
Start: 2022-12-01

## 2022-12-01 RX ORDER — OMEPRAZOLE 40 MG/1
40 CAPSULE, DELAYED RELEASE ORAL DAILY
Qty: 90 CAPSULE | Refills: 1 | Status: SHIPPED | OUTPATIENT
Start: 2022-12-01

## 2022-12-01 RX ORDER — LEVOCETIRIZINE DIHYDROCHLORIDE 5 MG/1
5 TABLET, FILM COATED ORAL EVERY EVENING
Qty: 90 TABLET | Refills: 1 | Status: SHIPPED | OUTPATIENT
Start: 2022-12-01

## 2023-02-08 ENCOUNTER — TELEMEDICINE (OUTPATIENT)
Dept: FAMILY MEDICINE CLINIC | Facility: TELEHEALTH | Age: 33
End: 2023-02-08
Payer: COMMERCIAL

## 2023-02-08 DIAGNOSIS — J01.40 ACUTE PANSINUSITIS, RECURRENCE NOT SPECIFIED: Primary | ICD-10-CM

## 2023-02-08 PROCEDURE — 99213 OFFICE O/P EST LOW 20 MIN: CPT | Performed by: NURSE PRACTITIONER

## 2023-02-08 RX ORDER — AMOXICILLIN AND CLAVULANATE POTASSIUM 875; 125 MG/1; MG/1
1 TABLET, FILM COATED ORAL 2 TIMES DAILY
Qty: 20 TABLET | Refills: 0 | Status: SHIPPED | OUTPATIENT
Start: 2023-02-08 | End: 2023-02-18

## 2023-02-08 RX ORDER — METHYLPREDNISOLONE 4 MG/1
TABLET ORAL
Qty: 21 TABLET | Refills: 0 | Status: SHIPPED | OUTPATIENT
Start: 2023-02-08 | End: 2023-02-15

## 2023-02-08 NOTE — PATIENT INSTRUCTIONS
Drink plenty of water  Over the counter pain relievers okay   If symptoms do not improve in 3-5 days follow up with your primary care provider or urgent care  If symptoms worsen follow up with urgent care or the emergency room      Sinusitis, Adult  Sinusitis is soreness and swelling (inflammation) of your sinuses. Sinuses are hollow spaces in the bones around your face. They are located:  Around your eyes.  In the middle of your forehead.  Behind your nose.  In your cheekbones.  Your sinuses and nasal passages are lined with a fluid called mucus. Mucus drains out of your sinuses. Swelling can trap mucus in your sinuses. This lets germs (bacteria, virus, or fungus) grow, which leads to infection. Most of the time, this condition is caused by a virus.  What are the causes?  This condition is caused by:  Allergies.  Asthma.  Germs.  Things that block your nose or sinuses.  Growths in the nose (nasal polyps).  Chemicals or irritants in the air.  Fungus (rare).  What increases the risk?  You are more likely to develop this condition if:  You have a weak body defense system (immune system).  You do a lot of swimming or diving.  You use nasal sprays too much.  You smoke.  What are the signs or symptoms?  The main symptoms of this condition are pain and a feeling of pressure around the sinuses. Other symptoms include:  Stuffy nose (congestion).  Runny nose (drainage).  Swelling and warmth in the sinuses.  Headache.  Toothache.  A cough that may get worse at night.  Mucus that collects in the throat or the back of the nose (postnasal drip).  Being unable to smell and taste.  Being very tired (fatigue).  A fever.  Sore throat.  Bad breath.  How is this diagnosed?  This condition is diagnosed based on:  Your symptoms.  Your medical history.  A physical exam.  Tests to find out if your condition is short-term (acute) or long-term (chronic). Your doctor may:  Check your nose for growths (polyps).  Check your sinuses using a  tool that has a light (endoscope).  Check for allergies or germs.  Do imaging tests, such as an MRI or CT scan.  How is this treated?  Treatment for this condition depends on the cause and whether it is short-term or long-term.  If caused by a virus, your symptoms should go away on their own within 10 days. You may be given medicines to relieve symptoms. They include:  Medicines that shrink swollen tissue in the nose.  Medicines that treat allergies (antihistamines).  A spray that treats swelling of the nostrils.   Rinses that help get rid of thick mucus in your nose (nasal saline washes).  If caused by bacteria, your doctor may wait to see if you will get better without treatment. You may be given antibiotic medicine if you have:  A very bad infection.  A weak body defense system.  If caused by growths in the nose, you may need to have surgery.  Follow these instructions at home:  Medicines  Take, use, or apply over-the-counter and prescription medicines only as told by your doctor. These may include nasal sprays.  If you were prescribed an antibiotic medicine, take it as told by your doctor. Do not stop taking the antibiotic even if you start to feel better.  Hydrate and humidify    Drink enough water to keep your pee (urine) pale yellow.  Use a cool mist humidifier to keep the humidity level in your home above 50%.  Breathe in steam for 10-15 minutes, 3-4 times a day, or as told by your doctor. You can do this in the bathroom while a hot shower is running.  Try not to spend time in cool or dry air.  Rest  Rest as much as you can.  Sleep with your head raised (elevated).  Make sure you get enough sleep each night.  General instructions    Put a warm, moist washcloth on your face 3-4 times a day, or as often as told by your doctor. This will help with discomfort.  Wash your hands often with soap and water. If there is no soap and water, use hand .  Do not smoke. Avoid being around people who are smoking  (secondhand smoke).  Keep all follow-up visits as told by your doctor. This is important.  Contact a doctor if:  You have a fever.  Your symptoms get worse.  Your symptoms do not get better within 10 days.  Get help right away if:  You have a very bad headache.  You cannot stop throwing up (vomiting).  You have very bad pain or swelling around your face or eyes.  You have trouble seeing.  You feel confused.  Your neck is stiff.  You have trouble breathing.  Summary  Sinusitis is swelling of your sinuses. Sinuses are hollow spaces in the bones around your face.  This condition is caused by tissues in your nose that become inflamed or swollen. This traps germs. These can lead to infection.  If you were prescribed an antibiotic medicine, take it as told by your doctor. Do not stop taking it even if you start to feel better.  Keep all follow-up visits as told by your doctor. This is important.  This information is not intended to replace advice given to you by your health care provider. Make sure you discuss any questions you have with your health care provider.  Document Revised: 05/20/2019 Document Reviewed: 05/20/2019  [employer] Patient Education © 2022 [employer]

## 2023-02-08 NOTE — PROGRESS NOTES
CHIEF COMPLAINT  Chief Complaint   Patient presents with   • Sinusitis         HPI  Ian Teixeira is a 32 y.o. male  presents with complaint of sinusitis after having COVID-19. He has gotten better from COVID-19 and now having worsening of simus symptoms.   He is > 10 days from onset of symptoms.     Review of Systems   Constitutional: Positive for fatigue. Negative for chills, diaphoresis and fever.   HENT: Positive for congestion, postnasal drip, sinus pressure and sinus pain. Negative for rhinorrhea, sneezing and sore throat.    Respiratory: Positive for cough. Negative for chest tightness, shortness of breath and wheezing.    Cardiovascular: Negative for chest pain.   Gastrointestinal: Positive for nausea. Negative for diarrhea and vomiting.   Musculoskeletal: Negative for myalgias.   Neurological: Positive for dizziness and headaches.       Past Medical History:   Diagnosis Date   • Allergic rhinitis    • Anxiety    • Vera's esophagus 2021   • GERD (gastroesophageal reflux disease)        Family History   Problem Relation Age of Onset   • Other Other         NO FAMILY HISTORY OF COLORECTAL CANCER       Social History     Socioeconomic History   • Marital status:    Tobacco Use   • Smoking status: Former     Years: 6.00     Types: Cigarettes     Quit date:      Years since quittin.1     Passive exposure: Never   • Smokeless tobacco: Never   Vaping Use   • Vaping Use: Former   • Substances: CBD   Substance and Sexual Activity   • Alcohol use: Not Currently     Comment: DRINKS WEEKLY, 2 DRINKS PER DAYS, HAS BEEN DRINKING FOR 6-10 YEARS   • Drug use: Never   • Sexual activity: Defer       Ian Teixeira  reports that he quit smoking about 7 years ago. His smoking use included cigarettes. He has never been exposed to tobacco smoke. He has never used smokeless tobacco..    There were no vitals taken for this visit.    PHYSICAL EXAM  Physical Exam   Constitutional: He is oriented to person,  place, and time. He appears well-developed and well-nourished. He does not have a sickly appearance. He does not appear ill. No distress.   HENT:   Head: Normocephalic and atraumatic.   Eyes: EOM are normal.   Neck: Neck normal appearance.  Pulmonary/Chest: Effort normal.  No respiratory distress.  Neurological: He is alert and oriented to person, place, and time.   Skin: Skin is dry.   Psychiatric: He has a normal mood and affect.       Results for orders placed or performed during the hospital encounter of 01/29/23   POCT SARS-CoV-2 Antigen REZA   ([facility])    Specimen: Swab   Result Value Ref Range    SARS Antigen Detected (A) Not Detected, Presumptive Negative    Internal Control Passed Passed    Lot Number [id number]     Expiration Date 11-28-23    POC Influenza A/B    Specimen: Swab   Result Value Ref Range    Rapid Influenza A Ag Negative Negative    Rapid Influenza B Ag Negative Negative    Internal Control Passed Passed    Lot Number [id number]     Expiration Date 11-28-23        Diagnoses and all orders for this visit:    1. Acute pansinusitis, recurrence not specified (Primary)    Other orders  -     methylPREDNISolone (MEDROL) 4 MG dose pack; Take as directed on package instructions.  Dispense: 21 tablet; Refill: 0  -     amoxicillin-clavulanate (Augmentin) 875-125 MG per tablet; Take 1 tablet by mouth 2 (Two) Times a Day for 10 days.  Dispense: 20 tablet; Refill: 0        The use of a video visit has been reviewed with the patient and verbal informed consent has been obtained. Myself and Ian Teixeira participated in this visit. The patient is located in [address] [place] [address]. I am located in [place], [place]. [employer] and [employer] were utilized.       Note Disclaimer: At [facility], we believe that sharing information builds trust and better   relationships. You are receiving this note because you recently visited [facility]. It is possible you   will see [facility]  information before a provider has talked with you about it. This kind of information can   be easy to misunderstand. To help you fully understand what it means for your health, we urge you to   discuss this note with your provider.    DEZ Zhong  02/08/2023  13:53 EST

## 2023-02-15 ENCOUNTER — TELEMEDICINE (OUTPATIENT)
Dept: FAMILY MEDICINE CLINIC | Facility: TELEHEALTH | Age: 33
End: 2023-02-15
Payer: COMMERCIAL

## 2023-02-15 VITALS — BODY MASS INDEX: 29.88 KG/M2 | WEIGHT: 175 LBS | HEIGHT: 64 IN

## 2023-02-15 DIAGNOSIS — A08.11 NOROVIRUS: Primary | ICD-10-CM

## 2023-02-15 PROCEDURE — 99213 OFFICE O/P EST LOW 20 MIN: CPT | Performed by: NURSE PRACTITIONER

## 2023-02-15 RX ORDER — ONDANSETRON HYDROCHLORIDE 8 MG/1
8 TABLET, FILM COATED ORAL EVERY 8 HOURS PRN
Qty: 9 TABLET | Refills: 0 | Status: SHIPPED | OUTPATIENT
Start: 2023-02-15

## 2023-02-15 NOTE — PROGRESS NOTES
You have chosen to receive care through a telehealth visit.  Do you consent to use a video/audio connection for your medical care today? Yes     CHIEF COMPLAINT  Cc: nausea, vomiting, diarrhea    HPI  Ian Teixeira is a 32 y.o. male  presents with complaint of nausea, vomiting, diarrhea. His symptoms started yesterday at work. His daughter just had a stomach virus. The patient is unsure whether his problem is from being on antibiotics from sinus problems post COVID or whether he has a stomach virus. He has nausea, vomted once and has had a lot of diarrhea. Addtionals symptoms are noted in the ROS portion of this visit.    Review of Systems   Constitutional: Positive for appetite change (decreased) and chills. Negative for fever.   HENT: Positive for congestion (baseline,clear to yellow), postnasal drip, rhinorrhea, sinus pressure (frontal) and sinus pain (frontal). Negative for sneezing and sore throat.    Respiratory: Negative for cough.    Gastrointestinal: Positive for diarrhea, nausea and vomiting. Negative for blood in stool.        Cramping   Musculoskeletal: Negative for myalgias.   Neurological: Positive for headaches.       Past Medical History:   Diagnosis Date   • Allergic rhinitis    • Anxiety    • Vera's esophagus 2021   • GERD (gastroesophageal reflux disease)        Family History   Problem Relation Age of Onset   • Other Other         NO FAMILY HISTORY OF COLORECTAL CANCER       Social History     Socioeconomic History   • Marital status:    Tobacco Use   • Smoking status: Former     Years: 6.00     Types: Cigarettes     Quit date: 2016     Years since quittin.1     Passive exposure: Never   • Smokeless tobacco: Never   Vaping Use   • Vaping Use: Former   • Substances: CBD   Substance and Sexual Activity   • Alcohol use: Not Currently     Comment: DRINKS WEEKLY, 2 DRINKS PER DAYS, HAS BEEN DRINKING FOR 6-10 YEARS   • Drug use: Never   • Sexual activity: Defer       Ian Teixeira  " reports that he quit smoking about 7 years ago. His smoking use included cigarettes. He has never been exposed to tobacco smoke. He has never used smokeless tobacco..       Ht 162.6 cm (64\")   Wt 79.4 kg (175 lb)   BMI 30.04 kg/m²     PHYSICAL EXAM  Physical Exam   Constitutional: He is oriented to person, place, and time. He appears well-developed and well-nourished.   HENT:   Head: Normocephalic and atraumatic.   Right Ear: External ear normal.   Left Ear: External ear normal.   Nose: Congestion present. Right sinus exhibits frontal sinus tenderness (patient directed exam). Left sinus exhibits frontal sinus tenderness (patient directed exam).   Eyes: Lids are normal. Right eye exhibits no discharge and no exudate. Left eye exhibits no discharge and no exudate. Right conjunctiva is not injected. Left conjunctiva is not injected.   Pulmonary/Chest: No accessory muscle usage. No tachypnea and no bradypnea.  No respiratory distress.No use of oxygen by nasal cannulaNo use of oxygen by mask noted.  Neurological: He is alert and oriented to person, place, and time. No cranial nerve deficit.   Skin: His skin appears normal.  Psychiatric: He has a normal mood and affect. His speech is normal and behavior is normal. Judgment and thought content normal.       Results for orders placed or performed during the hospital encounter of 01/29/23   POCT SARS-CoV-2 Antigen REZA   ([facility])    Specimen: Swab   Result Value Ref Range    SARS Antigen Detected (A) Not Detected, Presumptive Negative    Internal Control Passed Passed    Lot Number [id number]     Expiration Date 11-28-23    POC Influenza A/B    Specimen: Swab   Result Value Ref Range    Rapid Influenza A Ag Negative Negative    Rapid Influenza B Ag Negative Negative    Internal Control Passed Passed    Lot Number [id number]     Expiration Date 11-28-23        Diagnoses and all orders for this visit:    1. Norovirus (Primary)    Other orders  -     " ondansetron (Zofran) 8 MG tablet; Take 1 tablet by mouth Every 8 (Eight) Hours As Needed for Nausea or Vomiting.  Dispense: 9 tablet; Refill: 0    Zofran as needed for nausea  Hydrate well. Water, Pedialyte and Gatorade are best.  CORINA diet: Bananas, Rice, Applesauce, Sanders and Tea  Stop Augmentin for now  When nausea and vomiting stops, probiotics for two weeks related to taking antibiotics. The pharmacist can help you with this if needed. Do not take within two hours of antibiotic.    FOLLOW-UP  If symptoms worsen or persist follow up with PCP, [facility] or Urgent Care    Patient verbalizes understanding of medication dosage, comfort measures, instructions for treatment and follow-up.    Nava Murphy, APRN  02/15/2023  09:18 EST    The use of a video visit has been reviewed with the patient and verbal informed consent has been obtained. Myself and Ian Teixeira participated in this visit. The patient is located in [address] [place] [address].    I am located in [place], [place]. [employer] and [employer] were utilized. I spent 25 minutes in the patient's chart for this visit.

## 2023-03-23 ENCOUNTER — OFFICE VISIT (OUTPATIENT)
Dept: OTOLARYNGOLOGY | Facility: CLINIC | Age: 33
End: 2023-03-23
Payer: COMMERCIAL

## 2023-03-23 VITALS — BODY MASS INDEX: 30.35 KG/M2 | HEIGHT: 64 IN | TEMPERATURE: 97.4 F | WEIGHT: 177.8 LBS

## 2023-03-23 DIAGNOSIS — J30.9 ALLERGIC RHINITIS, UNSPECIFIED SEASONALITY, UNSPECIFIED TRIGGER: Primary | ICD-10-CM

## 2023-03-23 PROCEDURE — 1160F RVW MEDS BY RX/DR IN RCRD: CPT | Performed by: NURSE PRACTITIONER

## 2023-03-23 PROCEDURE — 1159F MED LIST DOCD IN RCRD: CPT | Performed by: NURSE PRACTITIONER

## 2023-03-23 PROCEDURE — 99214 OFFICE O/P EST MOD 30 MIN: CPT | Performed by: NURSE PRACTITIONER

## 2023-03-23 RX ORDER — AZELASTINE 1 MG/ML
2 SPRAY, METERED NASAL 2 TIMES DAILY
Qty: 30 ML | Refills: 5 | Status: SHIPPED | OUTPATIENT
Start: 2023-03-23

## 2023-03-23 RX ORDER — FLUTICASONE PROPIONATE 50 MCG
2 SPRAY, SUSPENSION (ML) NASAL DAILY
Qty: 16 G | Refills: 5 | Status: SHIPPED | OUTPATIENT
Start: 2023-03-23 | End: 2023-04-22

## 2023-03-23 NOTE — PROGRESS NOTES
Patient Name: Ian Teixeira   Visit Date: 03/23/2023   Patient ID: [id number]  Provider: DEZ Cruz    Sex: male  Location: [facility]   YOB: 1990  Location Address: [address] [place] [address], [address],?[place]?[address]    Primary Care Provider Monica Crump PA  Location Phone: [phone]    Referring Provider: No ref. provider found        Chief Complaint  Discuss Allergies    Subjective          Ian Teixeira is a 33 y.o. male who presents to [facility] for a follow-up visit of his allergies.  Patient states that he went to be seen at [facility] to discuss having repeat allergy testing but after waiting for several hours he was not seen.  He states that he has stopped doing his allergy shots for the last few weeks because he has not noticed an improvement in his symptoms.  He states he is still doing the daily rinsing of the nose taking Singulair and Zyrtec.  He has not been using his Flonase or azelastine nasal spray.  He reports that the environment that he works and is very dirty and augie but would like to have his allergy testing repeated.      Current Outpatient Medications on File Prior to Visit   Medication Sig   • levocetirizine (XYZAL) 5 MG tablet Take 1 tablet by mouth Every Evening.   • montelukast (SINGULAIR) 10 MG tablet Take 1 tablet by mouth Daily.   • omeprazole (priLOSEC) 40 MG capsule Take 1 capsule by mouth Daily.   • ondansetron (Zofran) 8 MG tablet Take 1 tablet by mouth Every 8 (Eight) Hours As Needed for Nausea or Vomiting.   • sertraline (ZOLOFT) 100 MG tablet Take 1 tablet by mouth Daily.   • hydrocortisone (ANUSOL-HC) 25 MG suppository Insert 1 suppository into the rectum 2 (Two) Times a Day As Needed for Hemorrhoids. (Patient not taking: Reported on 3/23/2023)     No current facility-administered medications on file prior to visit.        Social History     Tobacco Use   • Smoking status:  "Former     Years: 6.00     Types: Cigarettes     Quit date: 2016     Years since quittin.2     Passive exposure: Never   • Smokeless tobacco: Never   Vaping Use   • Vaping Use: Former   • Substances: CBD   Substance Use Topics   • Alcohol use: Not Currently     Comment: DRINKS WEEKLY, 2 DRINKS PER DAYS, HAS BEEN DRINKING FOR 6-10 YEARS   • Drug use: Never        Objective     Vital Signs:   Temp 97.4 °F (36.3 °C) (Temporal)   Ht 162.6 cm (64\")   Wt 80.6 kg (177 lb 12.8 oz)   BMI 30.52 kg/m²       Physical Exam  Constitutional:       General: He is not in acute distress.     Appearance: Normal appearance. He is not ill-appearing.   HENT:      Head: Normocephalic and atraumatic.      Jaw: There is normal jaw occlusion. No tenderness or pain on movement.      Salivary Glands: Right salivary gland is not diffusely enlarged or tender. Left salivary gland is not diffusely enlarged or tender.      Right Ear: Tympanic membrane, ear canal and external ear normal.      Left Ear: Tympanic membrane, ear canal and external ear normal.      Nose: Rhinorrhea present. No septal deviation. Rhinorrhea is clear.      Right Sinus: No maxillary sinus tenderness or frontal sinus tenderness.      Left Sinus: No maxillary sinus tenderness or frontal sinus tenderness.      Mouth/Throat:      Lips: Pink. No lesions.      Mouth: Mucous membranes are moist. No oral lesions.      Dentition: Normal dentition.      Tongue: No lesions.      Palate: No mass and lesions.      Pharynx: Oropharynx is clear. Uvula midline.      Tonsils: No tonsillar exudate.   Eyes:      Extraocular Movements: Extraocular movements intact.      Conjunctiva/sclera: Conjunctivae normal.      Pupils: Pupils are equal, round, and reactive to light.   Neck:      Thyroid: No thyroid mass, thyromegaly or thyroid tenderness.      Trachea: Trachea normal.   Pulmonary:      Effort: Pulmonary effort is normal. No respiratory distress.   Musculoskeletal:         General: " Normal range of motion.      Cervical back: Normal range of motion and neck supple. No tenderness.   Lymphadenopathy:      Cervical: No cervical adenopathy.   Skin:     General: Skin is warm and dry.   Neurological:      General: No focal deficit present.      Mental Status: He is alert and oriented to person, place, and time.      Cranial Nerves: No cranial nerve deficit.      Motor: No weakness.      Gait: Gait normal.   Psychiatric:         Mood and Affect: Mood normal.         Behavior: Behavior normal.         Thought Content: Thought content normal.         Judgment: Judgment normal.                Result Review :               Assessment and Plan    Diagnoses and all orders for this visit:    1. Allergic rhinitis, unspecified seasonality, unspecified trigger (Primary)  -     fluticasone (FLONASE) 50 MCG/ACT nasal spray; 2 sprays into the nostril(s) as directed by provider Daily for 30 days.  Dispense: 16 g; Refill: 5  -     azelastine (ASTELIN) 0.1 % nasal spray; 2 sprays into the nostril(s) as directed by provider 2 (Two) Times a Day. Use in each nostril as directed  Dispense: 30 mL; Refill: 5  -     Ambulatory Referral to Allergy    Am going to restart him on his Flonase and azelastine nasal spray.  Additionally we will get him a referral to family allergy and asthma.       Follow Up   No follow-ups on file.  Patient was given instructions and counseling regarding his condition or for health maintenance advice. Please see specific information pulled into the AVS if appropriate.     DEZ Cruz

## 2023-03-28 ENCOUNTER — TELEMEDICINE (OUTPATIENT)
Dept: FAMILY MEDICINE CLINIC | Facility: TELEHEALTH | Age: 33
End: 2023-03-28
Payer: COMMERCIAL

## 2023-03-28 DIAGNOSIS — J40 BRONCHITIS: Primary | ICD-10-CM

## 2023-03-28 PROCEDURE — 99213 OFFICE O/P EST LOW 20 MIN: CPT | Performed by: NURSE PRACTITIONER

## 2023-03-28 RX ORDER — ALBUTEROL SULFATE 90 UG/1
2 AEROSOL, METERED RESPIRATORY (INHALATION) EVERY 4 HOURS PRN
Qty: 18 G | Refills: 0 | Status: SHIPPED | OUTPATIENT
Start: 2023-03-28

## 2023-03-28 RX ORDER — METHYLPREDNISOLONE 4 MG/1
TABLET ORAL
Qty: 21 TABLET | Refills: 0 | Status: SHIPPED | OUTPATIENT
Start: 2023-03-28

## 2023-03-28 RX ORDER — BROMPHENIRAMINE MALEATE, PSEUDOEPHEDRINE HYDROCHLORIDE, AND DEXTROMETHORPHAN HYDROBROMIDE 2; 30; 10 MG/5ML; MG/5ML; MG/5ML
10 SYRUP ORAL 4 TIMES DAILY PRN
Qty: 200 ML | Refills: 0 | Status: SHIPPED | OUTPATIENT
Start: 2023-03-28

## 2023-03-28 NOTE — PROGRESS NOTES
You have chosen to receive care through a telehealth visit.  Do you consent to use a video/audio connection for your medical care today? Yes     CHIEF COMPLAINT  No chief complaint on file.        HPI  Ian Teixeira is a 33 y.o. male  presents with complaint of woke up with nasal congestion, PND, mild nausea, sore throat, mild cough with yellow/brown sputum, feels warm but unsure of fever, mild wheezing.  Denies shortness of breath, ear pain.  Not taking any medications for symptoms at this time    Review of Systems   See HPI    Past Medical History:   Diagnosis Date   • Allergic rhinitis    • Anxiety    • Vera's esophagus 2021   • GERD (gastroesophageal reflux disease)        Family History   Problem Relation Age of Onset   • Other Other         NO FAMILY HISTORY OF COLORECTAL CANCER       Social History     Socioeconomic History   • Marital status:    Tobacco Use   • Smoking status: Former     Years: 6.00     Types: Cigarettes     Quit date:      Years since quittin.2     Passive exposure: Never   • Smokeless tobacco: Never   Vaping Use   • Vaping Use: Former   • Substances: CBD   Substance and Sexual Activity   • Alcohol use: Not Currently     Comment: DRINKS WEEKLY, 2 DRINKS PER DAYS, HAS BEEN DRINKING FOR 6-10 YEARS   • Drug use: Never   • Sexual activity: Defer       Ian Teixeira  reports that he quit smoking about 7 years ago. His smoking use included cigarettes. He has never been exposed to tobacco smoke. He has never used smokeless tobacco..               There were no vitals taken for this visit.    PHYSICAL EXAM  Physical Exam   Constitutional: He is oriented to person, place, and time. He appears well-developed and well-nourished. He does not have a sickly appearance. He does not appear ill.   HENT:   Head: Normocephalic and atraumatic.   Pulmonary/Chest: Effort normal.  No respiratory distress.  Neurological: He is alert and oriented to person, place, and time.          Diagnoses and all orders for this visit:    1. Bronchitis (Primary)  -     brompheniramine-pseudoephedrine-DM 30-2-10 MG/5ML syrup; Take 10 mL by mouth 4 (Four) Times a Day As Needed for Congestion, Cough or Allergies.  Dispense: 200 mL; Refill: 0  -     methylPREDNISolone (MEDROL) 4 MG dose pack; Take as directed on package instructions.  Dispense: 21 tablet; Refill: 0  -     albuterol sulfate  (90 Base) MCG/ACT inhaler; Inhale 2 puffs Every 4 (Four) Hours As Needed for Wheezing.  Dispense: 18 g; Refill: 0    --take medications as prescribed  --increase fluids, rest as needed, tylenol or ibuprofen for pain  --f/u in 5-7 days if no improvement        FOLLOW-UP  As discussed during visit with PCP/[facility] if no improvement or Urgent Care/Emergency Department if worsening of symptoms    Patient verbalizes understanding of medication dosage, comfort measures, instructions for treatment and follow-up.    Yuliana Matson, DEZ  03/28/2023  10:45 EDT    The use of a video visit has been reviewed with the patient and verbal informed consent has been obtained. Myself and Ian Teixeira participated in this visit. The patient is located in [address] [place] [address].    I am located in [place], [place]. [employer] and [employer] were utilized. I spent 8 minutes in the patient's chart for this visit.

## 2023-05-24 DIAGNOSIS — J30.9 ALLERGIC RHINITIS, UNSPECIFIED SEASONALITY, UNSPECIFIED TRIGGER: Chronic | ICD-10-CM

## 2023-05-26 RX ORDER — LEVOCETIRIZINE DIHYDROCHLORIDE 5 MG/1
5 TABLET, FILM COATED ORAL EVERY EVENING
Qty: 90 TABLET | Refills: 0 | Status: SHIPPED | OUTPATIENT
Start: 2023-05-26

## 2023-06-09 ENCOUNTER — OFFICE VISIT (OUTPATIENT)
Dept: FAMILY MEDICINE CLINIC | Facility: CLINIC | Age: 33
End: 2023-06-09
Payer: COMMERCIAL

## 2023-06-09 VITALS
RESPIRATION RATE: 18 BRPM | BODY MASS INDEX: 29.37 KG/M2 | HEIGHT: 64 IN | HEART RATE: 61 BPM | WEIGHT: 172 LBS | OXYGEN SATURATION: 98 % | DIASTOLIC BLOOD PRESSURE: 78 MMHG | SYSTOLIC BLOOD PRESSURE: 134 MMHG

## 2023-06-09 DIAGNOSIS — F41.9 ANXIETY: Chronic | ICD-10-CM

## 2023-06-09 DIAGNOSIS — K21.9 GASTROESOPHAGEAL REFLUX DISEASE, UNSPECIFIED WHETHER ESOPHAGITIS PRESENT: Chronic | ICD-10-CM

## 2023-06-09 DIAGNOSIS — J30.9 ALLERGIC RHINITIS, UNSPECIFIED SEASONALITY, UNSPECIFIED TRIGGER: Chronic | ICD-10-CM

## 2023-06-09 PROCEDURE — 1160F RVW MEDS BY RX/DR IN RCRD: CPT | Performed by: PHYSICIAN ASSISTANT

## 2023-06-09 PROCEDURE — 99214 OFFICE O/P EST MOD 30 MIN: CPT | Performed by: PHYSICIAN ASSISTANT

## 2023-06-09 PROCEDURE — 1159F MED LIST DOCD IN RCRD: CPT | Performed by: PHYSICIAN ASSISTANT

## 2023-06-09 RX ORDER — OMEPRAZOLE 40 MG/1
40 CAPSULE, DELAYED RELEASE ORAL DAILY
Qty: 90 CAPSULE | Refills: 1 | Status: SHIPPED | OUTPATIENT
Start: 2023-06-09

## 2023-06-09 RX ORDER — SERTRALINE HYDROCHLORIDE 100 MG/1
100 TABLET, FILM COATED ORAL DAILY
Qty: 90 TABLET | Refills: 1 | Status: SHIPPED | OUTPATIENT
Start: 2023-06-09

## 2023-06-09 RX ORDER — MONTELUKAST SODIUM 10 MG/1
10 TABLET ORAL DAILY
Qty: 90 TABLET | Refills: 1 | Status: SHIPPED | OUTPATIENT
Start: 2023-06-09

## 2023-06-09 NOTE — PROGRESS NOTES
Chief Complaint  Chief Complaint   Patient presents with    Follow-up     6 month    Anxiety    Allergic Rhinitis    Heartburn       Subjective          Ian Teixeira presents to [facility] for 6 month follow up.    History of Present Illness    Allergic Rhinitis: Patient presents for management of Allergic Rhinitis. Patient is currently on Xyzal and Singulair. Symptoms are not well controlled. ENT referred patient to [facility] for testing.    GERD: Patient is currently on Omeprazole for the treatment of GERD. Patient is doing well without breakthrough symptoms. Patient has recently tried to discontinue medication and symptoms of reflux returned.    Anxiety: Patient is currently on Sertraline for anxiety and doing well. Patient states that symptoms are well controlled. 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 Patient scheduled initial appt with [facility].    Colon: 2.14.20    Medical History: has a past medical history of Allergic rhinitis, Anxiety, Vera's esophagus (01/21/2021), and GERD (gastroesophageal reflux disease).   Surgical History: has a past surgical history that includes Colonoscopy (2020); Esophagogastroduodenoscopy (2020); Splenectomy, total; and Vasectomy (10/2021).   Family History: family history includes Other in an other family member.   Social History: reports that he quit smoking about 7 years ago. His smoking use included cigarettes. He started smoking about 13 years ago. He has a 6.00 pack-year smoking history. He has never been exposed to tobacco smoke. He has never used smokeless tobacco. He reports current alcohol use. He reports that he does not use drugs.    Allergies: Patient has no known allergies.    Health Maintenance Due   Topic Date Due    ANNUAL PHYSICAL  Never done    GASTROSCOPY (EGD)  Never done       Immunization History   Administered Date(s) Administered    Covid-19 (Pfizer) Gray Cap Monovalent  "01/14/2022, 02/11/2022    Flu Vaccine Quad PF >36MO 01/14/2022    FluLaval/Fluzone >6mos 01/14/2022, 12/01/2022    Fluzone Quad >6mos (Multi-dose) 01/17/2018, 11/07/2018, 10/21/2019, 09/23/2020    Hep B, Adolescent or Pediatric 01/25/2015    Hepatitis B Adult/Adolescent IM 11/07/2018    Hib (PRP-T) 01/25/2015    Influenza Injectable Mdck Pf Quad 01/17/2018, 11/07/2018    Influenza, Unspecified 01/17/2018, 11/07/2018, 10/01/2020, 01/14/2022    Meningococcal Conjugate 01/25/2015    Pneumococcal Conjugate 13-Valent (PCV13) 01/25/2015    Pneumococcal Polysaccharide (PPSV23) 01/25/2015, 12/06/2019       Objective     Vitals:    06/09/23 1033   BP: 134/78   BP Location: Right arm   Patient Position: Sitting   Cuff Size: Adult   Pulse: 61   Resp: 18   SpO2: 98%   Weight: 78 kg (172 lb)   Height: 162.6 cm (64\")     Body mass index is 29.52 kg/m².     Wt Readings from Last 3 Encounters:   06/09/23 78 kg (172 lb)   05/11/23 78.9 kg (174 lb)   04/18/23 78.9 kg (174 lb)     BP Readings from Last 3 Encounters:   06/09/23 134/78   05/11/23 131/72   04/18/23 135/80       Patient Care Team:  Monica Crump PA as PCP - General (Family Medicine)    Physical Exam  Vitals and nursing note reviewed.   Constitutional:       Appearance: Normal appearance.   HENT:      Head: Normocephalic and atraumatic.   Neck:      Vascular: No carotid bruit.      Comments: Thyroid : gland size normal, nontender, no nodules or masses present on palpation   Cardiovascular:      Rate and Rhythm: Normal rate and regular rhythm.      Pulses: Normal pulses.      Heart sounds: Normal heart sounds.   Pulmonary:      Effort: Pulmonary effort is normal.      Breath sounds: Normal breath sounds.   Musculoskeletal:      Cervical back: Neck supple. No tenderness.      Right lower leg: No edema.      Left lower leg: No edema.   Lymphadenopathy:      Cervical: No cervical adenopathy.   Neurological:      Mental Status: He is alert.   Psychiatric:         Mood " and Affect: Mood normal.         Behavior: Behavior normal.         Result Review :                           Assessment and Plan      Diagnoses and all orders for this visit:    1. Gastroesophageal reflux disease, unspecified whether esophagitis present  Comments:  Stable on Omeprazole 40mg daily; continue and follow up in 6mths.  Orders:  -     omeprazole (priLOSEC) 40 MG capsule; Take 1 capsule by mouth Daily.  Dispense: 90 capsule; Refill: 1    2. Anxiety  Comments:  Stable on Zoloft 50mg daily; continue and follow up in 6mths.  Orders:  -     sertraline (ZOLOFT) 100 MG tablet; Take 1 tablet by mouth Daily.  Dispense: 90 tablet; Refill: 1    3. Allergic rhinitis, unspecified seasonality, unspecified trigger  Comments:  Not stable: continue with Singulair 10mg and Xyzal 5mg daily; continue with allergy testing.  Orders:  -     montelukast (SINGULAIR) 10 MG tablet; Take 1 tablet by mouth Daily.  Dispense: 90 tablet; Refill: 1            Follow Up     Return in about 6 months (around 12/9/2023).    Patient was given instructions and counseling regarding his condition or for health maintenance advice. Please see specific information pulled into the AVS if appropriate.

## 2023-08-19 DIAGNOSIS — F41.9 ANXIETY: Chronic | ICD-10-CM

## 2023-08-19 RX ORDER — SERTRALINE HYDROCHLORIDE 100 MG/1
100 TABLET, FILM COATED ORAL DAILY
Qty: 90 TABLET | Refills: 1 | OUTPATIENT
Start: 2023-08-19

## 2023-12-08 ENCOUNTER — OFFICE VISIT (OUTPATIENT)
Dept: FAMILY MEDICINE CLINIC | Facility: CLINIC | Age: 33
End: 2023-12-08
Payer: COMMERCIAL

## 2023-12-08 VITALS
SYSTOLIC BLOOD PRESSURE: 127 MMHG | HEIGHT: 64 IN | RESPIRATION RATE: 20 BRPM | BODY MASS INDEX: 29.47 KG/M2 | OXYGEN SATURATION: 99 % | HEART RATE: 65 BPM | DIASTOLIC BLOOD PRESSURE: 84 MMHG | WEIGHT: 172.6 LBS

## 2023-12-08 DIAGNOSIS — F95.2 TOURETTE'S DISORDER: ICD-10-CM

## 2023-12-08 DIAGNOSIS — Z00.00 ANNUAL PHYSICAL EXAM: ICD-10-CM

## 2023-12-08 DIAGNOSIS — J30.9 ALLERGIC RHINITIS, UNSPECIFIED SEASONALITY, UNSPECIFIED TRIGGER: Chronic | ICD-10-CM

## 2023-12-08 DIAGNOSIS — Z23 NEED FOR INFLUENZA VACCINATION: Primary | ICD-10-CM

## 2023-12-08 DIAGNOSIS — K21.9 GASTROESOPHAGEAL REFLUX DISEASE, UNSPECIFIED WHETHER ESOPHAGITIS PRESENT: Chronic | ICD-10-CM

## 2023-12-08 DIAGNOSIS — R68.89 FORGETFULNESS: ICD-10-CM

## 2023-12-08 DIAGNOSIS — F41.9 ANXIETY: Chronic | ICD-10-CM

## 2023-12-08 PROBLEM — F41.1 GENERALIZED ANXIETY DISORDER: Status: ACTIVE | Noted: 2023-12-08

## 2023-12-08 RX ORDER — OMEPRAZOLE 40 MG/1
40 CAPSULE, DELAYED RELEASE ORAL DAILY
Qty: 90 CAPSULE | Refills: 1 | Status: SHIPPED | OUTPATIENT
Start: 2023-12-08

## 2023-12-08 RX ORDER — SERTRALINE HYDROCHLORIDE 100 MG/1
100 TABLET, FILM COATED ORAL DAILY
Qty: 90 TABLET | Refills: 1 | Status: SHIPPED | OUTPATIENT
Start: 2023-12-08

## 2023-12-08 RX ORDER — MONTELUKAST SODIUM 10 MG/1
10 TABLET ORAL DAILY
Qty: 90 TABLET | Refills: 1 | Status: SHIPPED | OUTPATIENT
Start: 2023-12-08

## 2023-12-08 RX ORDER — LEVOCETIRIZINE DIHYDROCHLORIDE 5 MG/1
5 TABLET, FILM COATED ORAL EVERY EVENING
Qty: 90 TABLET | Refills: 0 | Status: SHIPPED | OUTPATIENT
Start: 2023-12-08

## 2023-12-08 NOTE — PROGRESS NOTES
Chief Complaint  Chief Complaint   Patient presents with    Follow-up     6 month     Heartburn    Anxiety    Allergic Rhinitis    Annual Exam       Subjective          Ian Teixeira presents to [facility] for 6 month follow up.     History of Present Illness    GERD: Patient is currently on Omeprazole for the treatment of GERD. Patient is doing well without breakthrough symptoms. Patient has recently tried to discontinue medication and symptoms of reflux returned.    Anxiety: Patient is currently on Sertraline for anxiety and doing well. Patient states that symptoms are well controlled.  Patient has noticed increased forgetfulness and increase of Tourette's symptoms    Allergic Rhinitis: Patient presents for management of Allergic Rhinitis. Patient is currently on Singulair. Symptoms are well controlled.    Patient complains of left sciatic pain, acute symptoms.  Has tried limited stretching    Dental and eye exams are up-to-date    Patient has established with Dr. Boothe for GI.  On 214/20 patient had EGD with findings suggestive of Vera's esophagitis.  Patient has not had follow-up since.  Patient encouraged to contact Dr. Boothe for a follow-up.      Medical History: has a past medical history of Allergic rhinitis, Anxiety, Vera's esophagus (01/21/2021), and GERD (gastroesophageal reflux disease).   Surgical History: has a past surgical history that includes Colonoscopy (2020); Esophagogastroduodenoscopy (2020); Splenectomy, total; and Vasectomy (10/2021).   Family History: family history includes Other in an other family member.   Social History: reports that he quit smoking about 7 years ago. His smoking use included cigarettes. He started smoking about 13 years ago. He has a 6.00 pack-year smoking history. He has never been exposed to tobacco smoke. He has never used smokeless tobacco. He reports current alcohol use. He reports that he does not use  "drugs.    Allergies: Patient has no known allergies.    Health Maintenance Due   Topic Date Due    TDAP/TD VACCINES (1 - Tdap) Never done    ANNUAL PHYSICAL  Never done    GASTROSCOPY (EGD)  Never done       Immunization History   Administered Date(s) Administered    Covid-19 (Pfizer) Gray Cap Monovalent 01/14/2022, 02/11/2022    Flu Vaccine Quad PF >36MO 01/14/2022    Fluzone (or Fluarix & Flulaval for VFC) >6mos 01/14/2022, 12/01/2022, 12/08/2023    Fluzone Quad >6mos (Multi-dose) 01/17/2018, 11/07/2018, 10/21/2019, 09/23/2020    Hep B, Adolescent or Pediatric 01/25/2015    Hepatitis B Adult/Adolescent IM 11/07/2018    Hib (PRP-T) 01/25/2015    Influenza Injectable Mdck Pf Quad 01/17/2018, 11/07/2018    Influenza, Unspecified 01/17/2018, 11/07/2018, 10/01/2020, 01/14/2022    Meningococcal Conjugate 01/25/2015    Pneumococcal Conjugate 13-Valent (PCV13) 01/25/2015    Pneumococcal Polysaccharide (PPSV23) 01/25/2015, 12/06/2019       Objective     Vitals:    12/08/23 1034   BP: 127/84   Pulse: 65   Resp: 20   SpO2: 99%   Weight: 78.3 kg (172 lb 9.6 oz)   Height: 162.6 cm (64\")     Body mass index is 29.63 kg/m².     Wt Readings from Last 3 Encounters:   12/08/23 78.3 kg (172 lb 9.6 oz)   06/09/23 78 kg (172 lb)   05/11/23 78.9 kg (174 lb)     BP Readings from Last 3 Encounters:   12/08/23 127/84   06/09/23 134/78   05/11/23 131/72       BMI is >= 25 and <30. (Overweight) The following options were offered after discussion;: weight loss educational material (shared in after visit summary)      Patient Care Team:  Monica Crump PA as PCP - General (Family Medicine)    Physical Exam  Vitals and nursing note reviewed.   Constitutional:       Appearance: Normal appearance.   HENT:      Head: Normocephalic and atraumatic.   Neck:      Vascular: No carotid bruit.      Comments: Thyroid : gland size normal, nontender, no nodules or masses present on palpation   Cardiovascular:      Rate and Rhythm: Normal rate and " regular rhythm.      Pulses: Normal pulses.      Heart sounds: Normal heart sounds.   Pulmonary:      Effort: Pulmonary effort is normal.      Breath sounds: Normal breath sounds.   Musculoskeletal:      Cervical back: Neck supple. No tenderness.      Right lower leg: No edema.      Left lower leg: No edema.   Lymphadenopathy:      Cervical: No cervical adenopathy.   Neurological:      Mental Status: He is alert.   Psychiatric:         Mood and Affect: Mood normal.         Behavior: Behavior normal.           Result Review :                           Assessment and Plan      Diagnoses and all orders for this visit:    1. Need for influenza vaccination (Primary)  -     Fluzone (or Fluarix & Flulaval for VFC) >6mos    2. Allergic rhinitis, unspecified seasonality, unspecified trigger  Comments:  Stable: Continue with Singulair 10 Mg daily and Xyzal 5 mg daily..  Orders:  -     montelukast (SINGULAIR) 10 MG tablet; Take 1 tablet by mouth Daily.  Dispense: 90 tablet; Refill: 1  -     levocetirizine (XYZAL) 5 MG tablet; Take 1 tablet by mouth Every Evening.  Dispense: 90 tablet; Refill: 0    3. Gastroesophageal reflux disease, unspecified whether esophagitis present  Comments:  Stable on Omeprazole 40mg daily; continue and follow up in 6mths.  Orders:  -     omeprazole (priLOSEC) 40 MG capsule; Take 1 capsule by mouth Daily.  Dispense: 90 capsule; Refill: 1    4. Anxiety  Comments:  Stable on Zoloft 50mg daily; continue and follow up in 6mths.  Orders:  -     sertraline (ZOLOFT) 100 MG tablet; Take 1 tablet by mouth Daily.  Dispense: 90 tablet; Refill: 1  -     Ambulatory Referral to Psychiatry    5. Annual physical exam  -     Comprehensive Metabolic Panel; Future  -     Lipid Panel; Future  -     CBC & Differential; Future  -     TSH; Future    6. Forgetfulness  -     Vitamin B12; Future  -     Folate; Future    7. Tourette's disorder  -     Ambulatory Referral to Psychiatry    The patient is advised to attempt to lose  weight, reduce exposure to stress, improve dietary compliance, continue current medications, continue current healthy lifestyle patterns, and return for routine annual checkups.        Follow Up     Return in about 6 months (around 6/8/2024).    Patient was given instructions and counseling regarding his condition or for health maintenance advice. Please see specific information pulled into the AVS if appropriate.

## 2023-12-12 ENCOUNTER — LAB (OUTPATIENT)
Dept: LAB | Facility: HOSPITAL | Age: 33
End: 2023-12-12
Payer: COMMERCIAL

## 2023-12-12 DIAGNOSIS — R68.89 FORGETFULNESS: ICD-10-CM

## 2023-12-12 DIAGNOSIS — Z00.00 ANNUAL PHYSICAL EXAM: ICD-10-CM

## 2023-12-12 LAB
ALBUMIN SERPL-MCNC: 4.5 G/DL (ref 3.5–5.2)
ALBUMIN/GLOB SERPL: 1.9 G/DL
ALP SERPL-CCNC: 63 U/L (ref 39–117)
ALT SERPL W P-5'-P-CCNC: 18 U/L (ref 1–41)
ANION GAP SERPL CALCULATED.3IONS-SCNC: 10 MMOL/L (ref 5–15)
AST SERPL-CCNC: 21 U/L (ref 1–40)
BASOPHILS # BLD AUTO: 0.1 10*3/MM3 (ref 0–0.2)
BASOPHILS NFR BLD AUTO: 1.5 % (ref 0–1.5)
BILIRUB SERPL-MCNC: 0.4 MG/DL (ref 0–1.2)
BUN SERPL-MCNC: 15 MG/DL (ref 6–20)
BUN/CREAT SERPL: 11.9 (ref 7–25)
CALCIUM SPEC-SCNC: 9.6 MG/DL (ref 8.6–10.5)
CHLORIDE SERPL-SCNC: 105 MMOL/L (ref 98–107)
CHOLEST SERPL-MCNC: 222 MG/DL (ref 0–200)
CO2 SERPL-SCNC: 26 MMOL/L (ref 22–29)
CREAT SERPL-MCNC: 1.26 MG/DL (ref 0.76–1.27)
DEPRECATED RDW RBC AUTO: 42.4 FL (ref 37–54)
EGFRCR SERPLBLD CKD-EPI 2021: 77.2 ML/MIN/1.73
EOSINOPHIL # BLD AUTO: 0.22 10*3/MM3 (ref 0–0.4)
EOSINOPHIL NFR BLD AUTO: 3.2 % (ref 0.3–6.2)
ERYTHROCYTE [DISTWIDTH] IN BLOOD BY AUTOMATED COUNT: 13.1 % (ref 12.3–15.4)
FOLATE SERPL-MCNC: 8.77 NG/ML (ref 4.78–24.2)
GLOBULIN UR ELPH-MCNC: 2.4 GM/DL
GLUCOSE SERPL-MCNC: 70 MG/DL (ref 65–99)
HCT VFR BLD AUTO: 43.3 % (ref 37.5–51)
HDLC SERPL-MCNC: 53 MG/DL (ref 40–60)
HGB BLD-MCNC: 14.3 G/DL (ref 13–17.7)
IMM GRANULOCYTES # BLD AUTO: 0.02 10*3/MM3 (ref 0–0.05)
IMM GRANULOCYTES NFR BLD AUTO: 0.3 % (ref 0–0.5)
LDLC SERPL CALC-MCNC: 150 MG/DL (ref 0–100)
LDLC/HDLC SERPL: 2.78 {RATIO}
LYMPHOCYTES # BLD AUTO: 3.42 10*3/MM3 (ref 0.7–3.1)
LYMPHOCYTES NFR BLD AUTO: 49.8 % (ref 19.6–45.3)
MCH RBC QN AUTO: 29.5 PG (ref 26.6–33)
MCHC RBC AUTO-ENTMCNC: 33 G/DL (ref 31.5–35.7)
MCV RBC AUTO: 89.3 FL (ref 79–97)
MONOCYTES # BLD AUTO: 0.67 10*3/MM3 (ref 0.1–0.9)
MONOCYTES NFR BLD AUTO: 9.8 % (ref 5–12)
NEUTROPHILS NFR BLD AUTO: 2.44 10*3/MM3 (ref 1.7–7)
NEUTROPHILS NFR BLD AUTO: 35.4 % (ref 42.7–76)
NRBC BLD AUTO-RTO: 0 /100 WBC (ref 0–0.2)
PLATELET # BLD AUTO: 406 10*3/MM3 (ref 140–450)
PMV BLD AUTO: 9.6 FL (ref 6–12)
POTASSIUM SERPL-SCNC: 4 MMOL/L (ref 3.5–5.2)
PROT SERPL-MCNC: 6.9 G/DL (ref 6–8.5)
RBC # BLD AUTO: 4.85 10*6/MM3 (ref 4.14–5.8)
SODIUM SERPL-SCNC: 141 MMOL/L (ref 136–145)
TRIGL SERPL-MCNC: 109 MG/DL (ref 0–150)
TSH SERPL DL<=0.05 MIU/L-ACNC: 0.83 UIU/ML (ref 0.27–4.2)
VIT B12 BLD-MCNC: 512 PG/ML (ref 211–946)
VLDLC SERPL-MCNC: 19 MG/DL (ref 5–40)
WBC NRBC COR # BLD AUTO: 6.87 10*3/MM3 (ref 3.4–10.8)

## 2023-12-12 PROCEDURE — 80061 LIPID PANEL: CPT

## 2023-12-12 PROCEDURE — 82607 VITAMIN B-12: CPT

## 2023-12-12 PROCEDURE — 80053 COMPREHEN METABOLIC PANEL: CPT

## 2023-12-12 PROCEDURE — 36415 COLL VENOUS BLD VENIPUNCTURE: CPT

## 2023-12-12 PROCEDURE — 82746 ASSAY OF FOLIC ACID SERUM: CPT

## 2023-12-12 PROCEDURE — 85025 COMPLETE CBC W/AUTO DIFF WBC: CPT

## 2023-12-12 PROCEDURE — 84443 ASSAY THYROID STIM HORMONE: CPT

## 2024-01-05 ENCOUNTER — OFFICE VISIT (OUTPATIENT)
Dept: PSYCHIATRY | Facility: CLINIC | Age: 34
End: 2024-01-05
Payer: COMMERCIAL

## 2024-01-05 VITALS
SYSTOLIC BLOOD PRESSURE: 120 MMHG | BODY MASS INDEX: 31 KG/M2 | HEART RATE: 78 BPM | DIASTOLIC BLOOD PRESSURE: 77 MMHG | WEIGHT: 181.6 LBS | HEIGHT: 64 IN

## 2024-01-05 DIAGNOSIS — Z87.898: ICD-10-CM

## 2024-01-05 DIAGNOSIS — F41.1 GAD (GENERALIZED ANXIETY DISORDER): Primary | ICD-10-CM

## 2024-01-05 RX ORDER — FLUTICASONE PROPIONATE 50 MCG
1 SPRAY, SUSPENSION (ML) NASAL DAILY
COMMUNITY
Start: 2023-12-26

## 2024-01-05 RX ORDER — BENZONATATE 100 MG/1
CAPSULE ORAL
COMMUNITY
Start: 2023-12-26 | End: 2024-01-05

## 2024-01-05 NOTE — PROGRESS NOTES
"[facility]  Initial Evaluation    Referring Provider:  [place]ump, [place]  [address]  [address]  [facility] [place] [address]    Chief Complaint: \"depression and anxiety from everyday life\"    History of Present Illness: Ian Teixeira is a 33 y.o. male who presents today for initial evaluation regarding anxiety. Mr. Teixeira presents unaccompanied in no acute distress and engages with me appropriately. Psychotropic regimen with which patient presents is described as sertraline 100 mg po daily.     History is positive for signs/symptoms suggestive of AMAN: consistent and excessive worry across several domains of life that contributes to tension and irritability throughout the day. He also has a sniff tic that has probably been exacerbated by recent COVID infection, and has a childhood history of Tourette's. Psychiatric screening is negative for pathognomonic history of: TBI, depression, PTSD, monica, psychosis, violence, and suicidality.     I have counseled the patient with regard to diagnoses and the recommended treatment regimen as documented below: I will assume prescriptive responsibility for nothing at this time since PCP prescribed the medicine. Spoke with patient about additional anti-anxiety medications that he could try such as buspirone, hydroxyzine, clonidine or propranolol.   Patient wishes to pursue acquiring his medical marijuana card/letter. I have explained to the patient my personal stance on use of marijuana: I do not take moral or medical issue with this substance, especially considering its medicinal use in several states across the nation. However, I do take issue with associated issues of dependence and its illicit status in [place]. I have reminded the patient that marijuana use in [place] may lead to serious legal consequences. I have advised the patient that mitigating or stopping use is ideal. However, I - primarily, I believe withdrawing " treatment from patients like this is of more risk than it is benefit for several reasons: it encourages further self-medication, relapse with regard to anxiety/mood states, and doing so diminishes trust in the healthcare system and alliance with healthcare professionals, which will have obvious and longstanding implications with regard to treatment for the patient. I place more salient importance on patient safety and wellbeing, as well as on development of treatment alliance, than I do in enforcing superfluously strict/prohibitive treatment parameters. The patient is willing to work with me on this issue and is being forthcoming about use, so in the interest of patient safety/wellbeing.  I provided information on [place] providers who prescribe/write for this. Cautioned about worsening anxiety and sometimes cases of psychosis with using certain suppliers of Delta-8/or 9 products.     Patient acknowledges the diagnoses per my rendered interpretation. Patient requests a therapy consult. Patient demonstrates awareness/understanding of viable alternatives for treatment as well as potential risks, benefits, and side effects associated with this regimen and is amenable to proceed in this fashion.     Recommended lifestyle changes: increase socializing, recommended marijuana doctor and gave information, decreases energy drink use    Psychiatric History:  Diagnoses: generalized anxiety disorder  Outpatient history: sees outside therapist  Inpatient history: none  Medication trials: Zoloft  Other treatment modalities: none  Presenting regimen: sertraline 100 mg po daily  Self harm: no  Suicide attempts: none    Substance Abuse History:   Types/methods/frequency: none  Withdrawal history: none  Sobriety: none  Transtheoretical stage: none    Social History:  Residence: lives house with wife and 6 kids  Vocation: manager at [employer]  Education: high school diploma  Pertinent developmental history: behavioral issue,  chinedu  Pertinent legal history: none  Hobbies/interests: working out, active with sports, rap and write music  Confucianism: Christianity  Exercise: lift weights, run, cardio  Dietary habits: no pertinent issues  Sleep hygiene: variable  Social habits: marijuana, and etoh use  Sunlight: no concern for under-exposure  Caffeine intake: 2 coffees, and energy drinks-600 mg   Hydration habits: no pertinent issues   history: none  Abuse/neglect; none    Social History     Socioeconomic History    Marital status:    Tobacco Use    Smoking status: Former     Packs/day: 1.00     Years: 6.00     Additional pack years: 0.00     Total pack years: 6.00     Types: Cigarettes     Start date:      Quit date:      Years since quittin.0     Passive exposure: Never    Smokeless tobacco: Never   Vaping Use    Vaping Use: Some days    Substances: CBD   Substance and Sexual Activity    Alcohol use: Yes     Comment: occassionally    Drug use: Never    Sexual activity: Defer     Access to Firearms: yes    Tobacco use counseling/intervention: N/A, patient does not use tobacco;  PHQ-9 Depression Screening  PHQ-9 Total Score: 13    Little interest or pleasure in doing things? 1-->several days   Feeling down, depressed, or hopeless? 1-->several days   Trouble falling or staying asleep, or sleeping too much? 2-->more than half the days   Feeling tired or having little energy? 1-->several days   Poor appetite or overeating? 2-->more than half the days   Feeling bad about yourself - or that you are a failure or have let yourself or your family down? 2-->more than half the days   Trouble concentrating on things, such as reading the newspaper or watching television? 2-->more than half the days   Moving or speaking so slowly that other people could have noticed? Or the opposite - being so fidgety or restless that you have been moving around a lot more than usual? 2-->more than half the days   Thoughts that you would be better  off dead, or of hurting yourself in some way? 0-->not at all   PHQ-9 Total Score 13     AMAN-7  Feeling nervous, anxious or on edge: More than half the days  Not being able to stop or control worrying: More than half the days  Worrying too much about different things: More than half the days  Trouble Relaxing: More than half the days  Being so restless that it is hard to sit still: More than half the days  Feeling afraid as if something awful might happen: More than half the days  Becoming easily annoyed or irritable: More than half the days  AMAN 7 Total Score: 14  If you checked any problems, how difficult have these problems made it for you to do your work, take care of things at home, or get along with other people: Very difficult    Problem List:  Patient Active Problem List   Diagnosis    Allergic rhinitis    Anxiety    GERD (gastroesophageal reflux disease)    Irritable bowel syndrome    History of COVID-19    Generalized anxiety disorder     Allergy:   No Known Allergies     Discontinued Medications:  Medications Discontinued During This Encounter   Medication Reason    benzonatate (TESSALON) 100 MG capsule *Therapy completed       Current Medications:   Current Outpatient Medications   Medication Sig Dispense Refill    albuterol sulfate  (90 Base) MCG/ACT inhaler Inhale 2 puffs Every 4 (Four) Hours As Needed for Wheezing. 18 g 0    fluticasone (FLONASE) 50 MCG/ACT nasal spray 1 spray into the nostril(s) as directed by provider Daily.      levocetirizine (XYZAL) 5 MG tablet Take 1 tablet by mouth Every Evening. 90 tablet 0    montelukast (SINGULAIR) 10 MG tablet Take 1 tablet by mouth Daily. 90 tablet 1    omeprazole (priLOSEC) 40 MG capsule Take 1 capsule by mouth Daily. 90 capsule 1    ondansetron (Zofran) 8 MG tablet Take 1 tablet by mouth Every 8 (Eight) Hours As Needed for Nausea or Vomiting. 9 tablet 0    sertraline (ZOLOFT) 100 MG tablet Take 1 tablet by mouth Daily. 90 tablet 1     No current  facility-administered medications for this visit.     Past Medical History:  Past Medical History:   Diagnosis Date    Allergic rhinitis     Anxiety     Vera's esophagus 01/21/2021    GERD (gastroesophageal reflux disease)      Past Surgical History:  Past Surgical History:   Procedure Laterality Date    COLONOSCOPY  2020    ENDOSCOPY  2020    SPLENECTOMY      REMOVAL    VASECTOMY  10/2021     Family History:   Family History   Problem Relation Age of Onset    No Known Problems Mother     Alcohol abuse Father     Anxiety disorder Sister     Depression Brother     Anxiety disorder Brother     No Known Problems Maternal Aunt     No Known Problems Paternal Aunt     No Known Problems Maternal Uncle     No Known Problems Paternal Uncle     Alcohol abuse Maternal Grandfather     No Known Problems Maternal Grandmother     Alcohol abuse Paternal Grandfather     No Known Problems Paternal Grandmother     ADD / ADHD Cousin     Other Other         NO FAMILY HISTORY OF COLORECTAL CANCER    ADD / ADHD Son     Bipolar disorder Neg Hx     Dementia Neg Hx     Drug abuse Neg Hx     OCD Neg Hx     Paranoid behavior Neg Hx     Schizophrenia Neg Hx     Seizures Neg Hx     Self-Injurious Behavior  Neg Hx     Suicide Attempts Neg Hx      negative for dementia, seizures, bipolar disorder, and substance dependence unless otherwise noted    Mental Status Exam:   Appearance: well-groomed, sits upright, age-appropriate, normal habitus  Behavior: calm, cooperative, appropriate in demeanor, appropriate eye-contact  Mood/affect: euthymic / mood-congruent and appropriate in both range and amplitude  Speech: within expected variance; appropriate rate, appropriate rhythm, appropriate tone; non-pressured  Thought Process: linear, goal-directed; no FOI or PANTERA; abstraction intact  Thought Content: coherent, devoid of overt delusions/perceptual disturbances  SI/HI: denies both SI and HI; exhibits future-orientation, self-advocates appropriately, no  "regular self-harm, no appreciable intent  Memory: no overt deficits  Orientation: oriented to person/place/time/situation  Concentration: appropriate during interview  Intellectual capacity: presumptively average  Insight: fair by given history/exam  Judgment: appropriate by given history/exam  Psychomotor: no appreciable latency/retardation/agitation/tremor  Gait: WNL    Review of Systems:  Review of Systems   Constitutional:  Negative for activity change, appetite change and unexpected weight change.   HENT:  Negative for drooling.    Eyes:  Negative for visual disturbance.   Respiratory:  Positive for shortness of breath. Negative for chest tightness.    Cardiovascular:  Negative for chest pain and palpitations.   Gastrointestinal:  Negative for abdominal pain, diarrhea and nausea.   Endocrine: Negative for cold intolerance and heat intolerance.   Genitourinary:  Negative for difficulty urinating and frequency.   Musculoskeletal:  Negative for myalgias and neck stiffness.   Skin:  Negative for rash.   Neurological:  Positive for dizziness, tremors and light-headedness. Negative for seizures.        Vital Signs:   /77   Pulse 78   Ht 162.6 cm (64\")   Wt 82.4 kg (181 lb 9.6 oz)   BMI 31.17 kg/m²    Lab Results:   Lab on 12/12/2023   Component Date Value Ref Range Status    Vitamin B-12 12/12/2023 512  211 - 946 pg/mL Final    Folate 12/12/2023 8.77  4.78 - 24.20 ng/mL Final    Glucose 12/12/2023 70  65 - 99 mg/dL Final    BUN 12/12/2023 15  6 - 20 mg/dL Final    Creatinine 12/12/2023 1.26  0.76 - 1.27 mg/dL Final    Sodium 12/12/2023 141  136 - 145 mmol/L Final    Potassium 12/12/2023 4.0  3.5 - 5.2 mmol/L Final    Chloride 12/12/2023 105  98 - 107 mmol/L Final    CO2 12/12/2023 26.0  22.0 - 29.0 mmol/L Final    Calcium 12/12/2023 9.6  8.6 - 10.5 mg/dL Final    Total Protein 12/12/2023 6.9  6.0 - 8.5 g/dL Final    Albumin 12/12/2023 4.5  3.5 - 5.2 g/dL Final    ALT (SGPT) 12/12/2023 18  1 - 41 U/L Final "    AST (SGOT) 12/12/2023 21  1 - 40 U/L Final    Alkaline Phosphatase 12/12/2023 63  39 - 117 U/L Final    Total Bilirubin 12/12/2023 0.4  0.0 - 1.2 mg/dL Final    Globulin 12/12/2023 2.4  gm/dL Final    A/G Ratio 12/12/2023 1.9  g/dL Final    BUN/Creatinine Ratio 12/12/2023 11.9  7.0 - 25.0 Final    Anion Gap 12/12/2023 10.0  5.0 - 15.0 mmol/L Final    eGFR 12/12/2023 77.2  >60.0 mL/min/1.73 Final    Total Cholesterol 12/12/2023 222 (H)  0 - 200 mg/dL Final    Triglycerides 12/12/2023 109  0 - 150 mg/dL Final    HDL Cholesterol 12/12/2023 53  40 - 60 mg/dL Final    LDL Cholesterol  12/12/2023 150 (H)  0 - 100 mg/dL Final    VLDL Cholesterol 12/12/2023 19  5 - 40 mg/dL Final    LDL/HDL Ratio 12/12/2023 2.78   Final    TSH 12/12/2023 0.831  0.270 - 4.200 uIU/mL Final    WBC 12/12/2023 6.87  3.40 - 10.80 10*3/mm3 Final    RBC 12/12/2023 4.85  4.14 - 5.80 10*6/mm3 Final    Hemoglobin 12/12/2023 14.3  13.0 - 17.7 g/dL Final    Hematocrit 12/12/2023 43.3  37.5 - 51.0 % Final    MCV 12/12/2023 89.3  79.0 - 97.0 fL Final    MCH 12/12/2023 29.5  26.6 - 33.0 pg Final    MCHC 12/12/2023 33.0  31.5 - 35.7 g/dL Final    RDW 12/12/2023 13.1  12.3 - 15.4 % Final    RDW-SD 12/12/2023 42.4  37.0 - 54.0 fl Final    MPV 12/12/2023 9.6  6.0 - 12.0 fL Final    Platelets 12/12/2023 406  140 - 450 10*3/mm3 Final    Neutrophil % 12/12/2023 35.4 (L)  42.7 - 76.0 % Final    Lymphocyte % 12/12/2023 49.8 (H)  19.6 - 45.3 % Final    Monocyte % 12/12/2023 9.8  5.0 - 12.0 % Final    Eosinophil % 12/12/2023 3.2  0.3 - 6.2 % Final    Basophil % 12/12/2023 1.5  0.0 - 1.5 % Final    Immature Grans % 12/12/2023 0.3  0.0 - 0.5 % Final    Neutrophils, Absolute 12/12/2023 2.44  1.70 - 7.00 10*3/mm3 Final    Lymphocytes, Absolute 12/12/2023 3.42 (H)  0.70 - 3.10 10*3/mm3 Final    Monocytes, Absolute 12/12/2023 0.67  0.10 - 0.90 10*3/mm3 Final    Eosinophils, Absolute 12/12/2023 0.22  0.00 - 0.40 10*3/mm3 Final    Basophils, Absolute 12/12/2023 0.10  0.00  - 0.20 10*3/mm3 Final    Immature Grans, Absolute 12/12/2023 0.02  0.00 - 0.05 10*3/mm3 Final    nRBC 12/12/2023 0.0  0.0 - 0.2 /100 WBC Final     EKG Results:  No orders to display     Imaging Results:  No Images in the past 120 days found..  ASSESSMENT AND PLAN:  No diagnosis found.    33 y.o. male who presents today for initial evaluation regarding anxiety. We have discussed the history and interpreted diagnoses as above as well as the treatment plan below, including potential R/B/SE of the recommended regimen of which the patient demonstrates understanding. Patient is agreeable to call 911 or go to the nearest ER should he become concerned for his own safety and/or the safety of those around him. There are no overt indices of acute monica/psychosis on evaluation today.     Medication regimen: sertraline 100 mg po daily; patient is advised not to misuse prescribed medications or to use any exogenous substances that aren't disclosed to this provider as they may interact with the regimen to his detriment.   Risk Assessment: protracted risk is low, imminent risk is low.  Risk factors include: anxiety disorder,and recent/ongoing psychosocial stressors. Protective factors include: no known family history of suicidality, intact reality testing, no substance use disorder, no access to firearms, no present SI, no stated history of suicide attempts or self-harm, patient's exhibited future-orientation, strong social support, and patient's cooperation with care. Do note that this is subject to change with the Hinduism of new stressors, treatment non-adherence, use of substances, and/or new medical ails.  Monitoring: reviewed labs/imaging as populated above, PHQ-9 today is 13/27, AMAN-7 today is 14/21  Therapy: referral made with Yossi Ray  Follow-up: 3 months, prn  Communications: N/A    TREATMENT PLAN/GOALS: challenge patterns of living conducive to symptom burden, implement recommended regimen as above with augmentative,  intermittent supportive psychotherapy to reduce symptom burden. Patient acknowledged and verbally consented to begin treatment as above. The importance of adherence to the recommended treatment and interval follow-up appointments was emphasized today. Patient was today advised to limit daily caffeine intake, hydrate appropriately, eat healthy and nutritious foods, engage sleep hygiene measures, engage appropriate exposure to sunlight, engage with hobbies in balance with life necessities, and exercise appropriate to their capacity to do so.     Billing: I have seen the patient today and considered his psychiatric complaints, rendered a diagnosis, and discussed treatment with the patient as above with which he consents.    Parts of this note are electronic transcriptions/translations of spoken language to printed text using the Dragon Dictation system.    Electronically signed by DEZ Martinez, 01/05/24,

## 2024-01-05 NOTE — TREATMENT PLAN
Multi-Disciplinary Problems (from Behavioral Health Treatment Plan)      Active Problems       Problem: Anxiety  Start Date: 01/05/24      Problem Details: The patient self-scales this problem as a 3 with 10 being the worst.          Goal Priority Start Date Expected End Date End Date    Patient will develop and implement behavioral and cognitive strategies to reduce anxiety and irrational fears. -- 01/05/24 -- --    Goal Details: Progress toward goal:  The patient self-scales their progress related to this goal as a 3 with 10 being the worst.          Goal Intervention Frequency Start Date End Date    Help patient explore past emotional issues in relation to present anxiety. Weekly 01/05/24 --    Intervention Details: Duration of treatment until until remission of symptoms.          Goal Intervention Frequency Start Date End Date    Help patient develop an awareness of their cognitive and physical responses to anxiety. Weekly 01/05/24 --    Intervention Details: Duration of treatment until until remission of symptoms.                                 I have discussed and reviewed this treatment plan with the patient.  It has been printed for signatures.

## 2024-06-21 ENCOUNTER — OFFICE VISIT (OUTPATIENT)
Dept: FAMILY MEDICINE CLINIC | Facility: CLINIC | Age: 34
End: 2024-06-21
Payer: COMMERCIAL

## 2024-06-21 VITALS
HEIGHT: 64 IN | SYSTOLIC BLOOD PRESSURE: 124 MMHG | OXYGEN SATURATION: 96 % | RESPIRATION RATE: 20 BRPM | HEART RATE: 58 BPM | DIASTOLIC BLOOD PRESSURE: 84 MMHG | BODY MASS INDEX: 31.04 KG/M2 | WEIGHT: 181.8 LBS

## 2024-06-21 DIAGNOSIS — F95.2 TOURETTE'S DISORDER: ICD-10-CM

## 2024-06-21 DIAGNOSIS — F95.2 TOURETTE'S DISORDER: Primary | Chronic | ICD-10-CM

## 2024-06-21 PROCEDURE — 1126F AMNT PAIN NOTED NONE PRSNT: CPT | Performed by: PHYSICIAN ASSISTANT

## 2024-06-21 PROCEDURE — 1160F RVW MEDS BY RX/DR IN RCRD: CPT | Performed by: PHYSICIAN ASSISTANT

## 2024-06-21 PROCEDURE — 99214 OFFICE O/P EST MOD 30 MIN: CPT | Performed by: PHYSICIAN ASSISTANT

## 2024-06-21 PROCEDURE — 1159F MED LIST DOCD IN RCRD: CPT | Performed by: PHYSICIAN ASSISTANT

## 2024-06-21 RX ORDER — CLONIDINE HYDROCHLORIDE 0.1 MG/1
0.05 TABLET ORAL DAILY
Qty: 90 TABLET | OUTPATIENT
Start: 2024-06-21

## 2024-06-21 RX ORDER — CLONIDINE HYDROCHLORIDE 0.1 MG/1
0.05 TABLET ORAL 2 TIMES DAILY
Qty: 30 TABLET | Refills: 1 | Status: SHIPPED | OUTPATIENT
Start: 2024-06-21

## 2024-06-21 NOTE — PROGRESS NOTES
"Chief Complaint  Chief Complaint   Patient presents with    Tourette Syndrome       Subjective          Ian Teixeira presents to [facility] for worsening Tourette sx.    History of Present Illness    Tourette's: Symptoms include head twitch, twitching nose, sniffing of nose. Patient is currently on SSRI.  Patient states symptoms worsened before Cayla.  Patient has noticed increased symptoms and lasting longer.  Patient states notes increased headaches and dizziness with \"bad episodes\".  Patient notes decreased concentration.  Patient states symptoms occur while driving.  Patient does have some obsessive/compulsive thoughts to include always having a tissue in his pocket and washing his hands and face well after eating.  Patient also has symptoms of attention deficit to include starting numerous projects without finishing and difficulty concentrating on a specific test.  Patient does have a therapist that he sees anywhere from once a week to every 3 weeks which he thinks is helping.  Patient also states meditation helps but patient is still having worsening symptoms.    Medical History: has a past medical history of Allergic rhinitis, Anxiety, Vera's esophagus (01/21/2021), and GERD (gastroesophageal reflux disease).   Surgical History: has a past surgical history that includes Colonoscopy (2020); Esophagogastroduodenoscopy (2020); Splenectomy, total; and Vasectomy (10/2021).   Family History: family history includes ADD / ADHD in his cousin and son; Alcohol abuse in his father, maternal grandfather, and paternal grandfather; Anxiety disorder in his brother and sister; Depression in his brother; No Known Problems in his maternal aunt, maternal grandmother, maternal uncle, mother, paternal aunt, paternal grandmother, and paternal uncle; Other in an other family member.   Social History: reports that he quit smoking about 8 years ago. His smoking use included cigarettes. He " "started smoking about 14 years ago. He has a 6 pack-year smoking history. He has never been exposed to tobacco smoke. He has never used smokeless tobacco. He reports current alcohol use. He reports that he does not use drugs.    Allergies: Patient has no known allergies.    Health Maintenance Due   Topic Date Due    GASTROSCOPY (EGD)  02/01/2023       Objective     Vitals:    06/21/24 0808   BP: 124/84   Pulse: 58   Resp: 20   SpO2: 96%   Weight: 82.5 kg (181 lb 12.8 oz)   Height: 162.6 cm (64\")     Body mass index is 31.21 kg/m².     Wt Readings from Last 3 Encounters:   06/21/24 82.5 kg (181 lb 12.8 oz)   01/23/24 79.4 kg (175 lb)   01/05/24 82.4 kg (181 lb 9.6 oz)     BP Readings from Last 3 Encounters:   06/21/24 124/84   01/23/24 130/74   01/05/24 120/77              Patient Care Team:  Monica Crump PA as PCP - General (Family Medicine)    Physical Exam  Vitals and nursing note reviewed.   Constitutional:       Appearance: Normal appearance.   HENT:      Head: Normocephalic and atraumatic.   Neck:      Vascular: No carotid bruit.      Comments: Thyroid : gland size normal, nontender, no nodules or masses present on palpation   Cardiovascular:      Rate and Rhythm: Normal rate and regular rhythm.      Pulses: Normal pulses.      Heart sounds: Normal heart sounds.   Pulmonary:      Effort: Pulmonary effort is normal.      Breath sounds: Normal breath sounds.   Musculoskeletal:      Cervical back: Neck supple. No tenderness.      Right lower leg: No edema.      Left lower leg: No edema.   Lymphadenopathy:      Cervical: No cervical adenopathy.   Neurological:      Mental Status: He is alert.      Comments: Mild tics noted of sniffing nose and mild head twitch.   Psychiatric:         Mood and Affect: Mood normal.         Behavior: Behavior normal.           Result Review :              Assessment and Plan      Diagnoses and all orders for this visit:    1. Tourette's disorder " (Primary)  Comments:  Worsening sx: continue on Sertraline 100mg daily; add Clonidine 0.1mg 1/2 tablet twice daily; f/u about 4-6 weeks to check progress.  Orders:  -     cloNIDine (Catapres) 0.1 MG tablet; Take 0.5 tablets by mouth 2 (Two) Times a Day.  Dispense: 30 tablet; Refill: 1            Follow Up     Return in about 4 weeks (around 7/19/2024).    Patient was given instructions and counseling regarding his condition or for health maintenance advice. Please see specific information pulled into the AVS if appropriate.

## 2024-07-07 DIAGNOSIS — J30.9 ALLERGIC RHINITIS, UNSPECIFIED SEASONALITY, UNSPECIFIED TRIGGER: Chronic | ICD-10-CM

## 2024-07-07 DIAGNOSIS — K21.9 GASTROESOPHAGEAL REFLUX DISEASE, UNSPECIFIED WHETHER ESOPHAGITIS PRESENT: Chronic | ICD-10-CM

## 2024-07-08 RX ORDER — LEVOCETIRIZINE DIHYDROCHLORIDE 5 MG/1
5 TABLET, FILM COATED ORAL EVERY EVENING
Qty: 90 TABLET | Refills: 1 | Status: SHIPPED | OUTPATIENT
Start: 2024-07-08

## 2024-07-08 RX ORDER — OMEPRAZOLE 40 MG/1
40 CAPSULE, DELAYED RELEASE ORAL DAILY
Qty: 90 CAPSULE | Refills: 1 | Status: SHIPPED | OUTPATIENT
Start: 2024-07-08

## 2024-07-12 ENCOUNTER — PATIENT ROUNDING (BHMG ONLY) (OUTPATIENT)
Dept: URGENT CARE | Facility: CLINIC | Age: 34
End: 2024-07-12
Payer: COMMERCIAL

## 2024-07-12 NOTE — ED NOTES
Thank you for letting us care for you in your recent visit to our urgent care center. We would love to hear about your experience with us. Was this the first time you have visited our location?    We’re always looking for ways to make our patients’ experiences even better. Do you have any recommendations on ways we may improve?     I appreciate you taking the time to respond. Please be on the lookout for a survey about your recent visit from [employer] via text or email. We would greatly appreciate if you could fill that out and turn it back in. We want your voice to be heard and we value your feedback.   Thank you for choosing [facility] for your healthcare needs.    [FreeTextEntry1] : 5 hospitalizations, last Oct 2005, 2 suicide attempts last Oct 2005,  MDD episodes Spring 2019 and again Dec 2019.  Past meds: Prozac (diarrhea), Seroquel, Ambien (parasomina behaviors, confusion)

## 2024-07-26 ENCOUNTER — OFFICE VISIT (OUTPATIENT)
Dept: FAMILY MEDICINE CLINIC | Facility: CLINIC | Age: 34
End: 2024-07-26
Payer: COMMERCIAL

## 2024-07-26 VITALS
DIASTOLIC BLOOD PRESSURE: 69 MMHG | WEIGHT: 183.2 LBS | HEIGHT: 65 IN | OXYGEN SATURATION: 98 % | RESPIRATION RATE: 20 BRPM | SYSTOLIC BLOOD PRESSURE: 124 MMHG | HEART RATE: 78 BPM | BODY MASS INDEX: 30.52 KG/M2

## 2024-07-26 DIAGNOSIS — F95.2 TOURETTE'S DISORDER: Primary | Chronic | ICD-10-CM

## 2024-07-26 PROCEDURE — 1126F AMNT PAIN NOTED NONE PRSNT: CPT | Performed by: PHYSICIAN ASSISTANT

## 2024-07-26 PROCEDURE — 99214 OFFICE O/P EST MOD 30 MIN: CPT | Performed by: PHYSICIAN ASSISTANT

## 2024-07-26 NOTE — PROGRESS NOTES
"Chief Complaint  Chief Complaint   Patient presents with    Follow-up     1 month     Tourette Syndrome       Subjective          Ian Teixeira presents to [facility] for 1 month follow up on worsening tourette's sx.     History of Present Illness    At last office visit, pt was started on Clonidine 0.1mg 1/2 tablet twice daily, in addition to the Sertraline 100mg which he was already on.    Patient didn't notice any improvement in his tics but did experience agitation and felt \"on edge\". He didn't take last night's dose.    Medical History: has a past medical history of Allergic rhinitis, Anxiety, Vera's esophagus (01/21/2021), and GERD (gastroesophageal reflux disease).   Surgical History: has a past surgical history that includes Colonoscopy (2020); Esophagogastroduodenoscopy (2020); Splenectomy, total; and Vasectomy (10/2021).   Family History: family history includes ADD / ADHD in his cousin and son; Alcohol abuse in his father, maternal grandfather, and paternal grandfather; Anxiety disorder in his brother and sister; Depression in his brother; No Known Problems in his maternal aunt, maternal grandmother, maternal uncle, mother, paternal aunt, paternal grandmother, and paternal uncle; Other in an other family member.   Social History: reports that he quit smoking about 8 years ago. His smoking use included cigarettes. He started smoking about 14 years ago. He has a 6 pack-year smoking history. He has never been exposed to tobacco smoke. He has never used smokeless tobacco. He reports current alcohol use. He reports that he does not use drugs.    Allergies: Patient has no known allergies.    Health Maintenance Due   Topic Date Due    GASTROSCOPY (EGD)  02/01/2023       Objective     Vitals:    07/26/24 0826   BP: 124/69   BP Location: Left arm   Patient Position: Sitting   Cuff Size: Large Adult   Pulse: 78   Resp: 20   SpO2: 98%   Weight: 83.1 kg (183 lb 3.2 oz)   Height: " "165.1 cm (65\")     Body mass index is 30.49 kg/m².     Wt Readings from Last 3 Encounters:   07/26/24 83.1 kg (183 lb 3.2 oz)   07/02/24 82.1 kg (181 lb)   06/21/24 82.5 kg (181 lb 12.8 oz)     BP Readings from Last 3 Encounters:   07/26/24 124/69   07/02/24 122/79   06/21/24 124/84     Patient Care Team:  Monica Crump PA as PCP - General (Family Medicine)    Physical Exam  Vitals and nursing note reviewed.   Constitutional:       Appearance: Normal appearance.   HENT:      Head: Normocephalic and atraumatic.   Neck:      Vascular: No carotid bruit.      Comments: Thyroid : gland size normal, nontender, no nodules or masses present on palpation   Cardiovascular:      Rate and Rhythm: Normal rate and regular rhythm.      Pulses: Normal pulses.      Heart sounds: Normal heart sounds.   Pulmonary:      Effort: Pulmonary effort is normal.      Breath sounds: Normal breath sounds.   Musculoskeletal:      Cervical back: Neck supple. No tenderness.   Lymphadenopathy:      Cervical: No cervical adenopathy.   Neurological:      Mental Status: He is alert.      Comments: Minimal tics noticed today of head twitching and nose sniffing.   Psychiatric:         Mood and Affect: Mood normal.         Behavior: Behavior normal.           Result Review :              Assessment and Plan      Diagnoses and all orders for this visit:    1. Tourette's disorder (Primary)  Comments:  Not stable: had side effects of clonidine; discontinue and refer to movement disorders clinic in [place].  Orders:  -     Ambulatory Referral to Neurology            Follow Up     Return in about 6 months (around 1/26/2025).    Patient was given instructions and counseling regarding his condition or for health maintenance advice. Please see specific information pulled into the AVS if appropriate.          "

## 2024-09-18 ENCOUNTER — TELEPHONE (OUTPATIENT)
Dept: FAMILY MEDICINE CLINIC | Facility: CLINIC | Age: 34
End: 2024-09-18
Payer: COMMERCIAL

## 2024-10-10 DIAGNOSIS — F41.9 ANXIETY: Chronic | ICD-10-CM

## 2024-10-11 RX ORDER — SERTRALINE HYDROCHLORIDE 100 MG/1
100 TABLET, FILM COATED ORAL DAILY
Qty: 90 TABLET | Refills: 1 | Status: SHIPPED | OUTPATIENT
Start: 2024-10-11

## 2024-10-17 NOTE — PROGRESS NOTES
Chief Complaint  Chief Complaint   Patient presents with    Follow-up    neck and shoulder pain       Subjective          Ian Teixeira presents to [facility] for neck and shoulder pain related to tics.    History of Present Illness    Patient presents for posterior head and neck pain related to tics. Notices symptoms more when tics are more forceful/frequent. Has appt with neuro in [place] in March 2025. He denies injury. Intermittent pain in neck and between shoulder blades. 4/10 on pain scale today.    Labs 12/12/23  --LDL is elevated; decrease fats and fried foods in diet and increase exercise; work on weight reduction.     Medical History: has a past medical history of Allergic rhinitis, Anxiety, Vera's esophagus (01/21/2021), and GERD (gastroesophageal reflux disease).   Surgical History: has a past surgical history that includes Colonoscopy (2020); Esophagogastroduodenoscopy (2020); Splenectomy, total; and Vasectomy (10/2021).   Family History: family history includes ADD / ADHD in his cousin and son; Alcohol abuse in his father, maternal grandfather, and paternal grandfather; Anxiety disorder in his brother and sister; Depression in his brother; No Known Problems in his maternal aunt, maternal grandmother, maternal uncle, mother, paternal aunt, paternal grandmother, and paternal uncle; Other in an other family member.   Social History: reports that he quit smoking about 8 years ago. His smoking use included cigarettes. He started smoking about 14 years ago. He has a 6 pack-year smoking history. He has never been exposed to tobacco smoke. He has never used smokeless tobacco. He reports current alcohol use. He reports that he does not use drugs.    Allergies: Patient has no known allergies.    Health Maintenance Due   Topic Date Due    TDAP/TD VACCINES (1 - Tdap) Never done    GASTROSCOPY (EGD)  02/01/2023       Objective     Vitals:    10/18/24 0938   BP: 125/82   BP  "Location: Left arm   Pulse: 67   Resp: 20   SpO2: 96%   Weight: 82.9 kg (182 lb 12.8 oz)   Height: 165.1 cm (65\")     Body mass index is 30.42 kg/m².     Wt Readings from Last 3 Encounters:   10/18/24 82.9 kg (182 lb 12.8 oz)   10/13/24 81.6 kg (180 lb)   07/26/24 83.1 kg (183 lb 3.2 oz)     BP Readings from Last 3 Encounters:   10/18/24 125/82   10/13/24 118/73   07/26/24 124/69     Patient Care Team:  Monica Crump PA as PCP - General (Family Medicine)    Physical Exam  Vitals and nursing note reviewed.   Constitutional:       Appearance: Normal appearance.   HENT:      Head: Normocephalic and atraumatic.   Neck:      Vascular: No carotid bruit.      Comments: Thyroid : gland size normal, nontender, no nodules or masses present on palpation   Cardiovascular:      Rate and Rhythm: Normal rate and regular rhythm.      Pulses: Normal pulses.      Heart sounds: Normal heart sounds.   Pulmonary:      Effort: Pulmonary effort is normal.      Breath sounds: Normal breath sounds.   Musculoskeletal:      Cervical back: Neck supple. Tenderness present. Decreased range of motion.      Comments: Tenderness in right trapezius area.   Lymphadenopathy:      Cervical: No cervical adenopathy.   Neurological:      Mental Status: He is alert.   Psychiatric:         Mood and Affect: Mood normal.         Behavior: Behavior normal.           Result Review :              Assessment and Plan      Diagnoses and all orders for this visit:    1. Need for influenza vaccination (Primary)  -     Fluzone >6mos ([id number])    2. Neck pain  -     XR Spine Cervical 2 or 3 View; Future  -     cyclobenzaprine (FLEXERIL) 10 MG tablet; Take 1 tablet by mouth At Night As Needed for Muscle Spasms (neck pain/spasm).  Dispense: 30 tablet; Refill: 1    3. Spasm  -     XR Spine Cervical 2 or 3 View; Future  -     cyclobenzaprine (FLEXERIL) 10 MG tablet; Take 1 tablet by mouth At Night As Needed for Muscle Spasms (neck pain/spasm).  Dispense: 30 " tablet; Refill: 1    PLAN: new acute problem--most likely related to tics; trial of Flexeril 10mg at night as needed; warned of sedation. Check C-spine xray. F/u if no improvement.        Follow Up     No follow-ups on file.    Patient was given instructions and counseling regarding his condition or for health maintenance advice. Please see specific information pulled into the AVS if appropriate.

## 2024-10-18 ENCOUNTER — OFFICE VISIT (OUTPATIENT)
Dept: FAMILY MEDICINE CLINIC | Facility: CLINIC | Age: 34
End: 2024-10-18
Payer: COMMERCIAL

## 2024-10-18 VITALS
SYSTOLIC BLOOD PRESSURE: 125 MMHG | WEIGHT: 182.8 LBS | BODY MASS INDEX: 30.46 KG/M2 | HEIGHT: 65 IN | DIASTOLIC BLOOD PRESSURE: 82 MMHG | HEART RATE: 67 BPM | OXYGEN SATURATION: 96 % | RESPIRATION RATE: 20 BRPM

## 2024-10-18 DIAGNOSIS — R25.2 SPASM: ICD-10-CM

## 2024-10-18 DIAGNOSIS — M54.2 NECK PAIN: ICD-10-CM

## 2024-10-18 DIAGNOSIS — Z23 NEED FOR INFLUENZA VACCINATION: Primary | ICD-10-CM

## 2024-10-18 PROCEDURE — 90656 IIV3 VACC NO PRSV 0.5 ML IM: CPT | Performed by: PHYSICIAN ASSISTANT

## 2024-10-18 PROCEDURE — 99213 OFFICE O/P EST LOW 20 MIN: CPT | Performed by: PHYSICIAN ASSISTANT

## 2024-10-18 PROCEDURE — 1125F AMNT PAIN NOTED PAIN PRSNT: CPT | Performed by: PHYSICIAN ASSISTANT

## 2024-10-18 PROCEDURE — 90471 IMMUNIZATION ADMIN: CPT | Performed by: PHYSICIAN ASSISTANT

## 2024-10-18 RX ORDER — CYCLOBENZAPRINE HCL 10 MG
10 TABLET ORAL NIGHTLY PRN
Qty: 30 TABLET | Refills: 1 | Status: SHIPPED | OUTPATIENT
Start: 2024-10-18

## 2024-11-05 ENCOUNTER — OFFICE VISIT (OUTPATIENT)
Dept: FAMILY MEDICINE CLINIC | Facility: CLINIC | Age: 34
End: 2024-11-05
Payer: COMMERCIAL

## 2024-11-05 VITALS
WEIGHT: 183.6 LBS | SYSTOLIC BLOOD PRESSURE: 98 MMHG | TEMPERATURE: 97.8 F | BODY MASS INDEX: 30.59 KG/M2 | OXYGEN SATURATION: 97 % | HEIGHT: 65 IN | DIASTOLIC BLOOD PRESSURE: 80 MMHG | HEART RATE: 94 BPM

## 2024-11-05 DIAGNOSIS — H69.93 ETD (EUSTACHIAN TUBE DYSFUNCTION), BILATERAL: ICD-10-CM

## 2024-11-05 DIAGNOSIS — J06.9 VIRAL URI: ICD-10-CM

## 2024-11-05 DIAGNOSIS — Z20.822 CLOSE EXPOSURE TO COVID-19 VIRUS: Primary | ICD-10-CM

## 2024-11-05 LAB
EXPIRATION DATE: NORMAL
FLUAV AG UPPER RESP QL IA.RAPID: NOT DETECTED
FLUBV AG UPPER RESP QL IA.RAPID: NOT DETECTED
INTERNAL CONTROL: NORMAL
Lab: NORMAL
SARS-COV-2 AG UPPER RESP QL IA.RAPID: NORMAL

## 2024-11-05 PROCEDURE — 87428 SARSCOV & INF VIR A&B AG IA: CPT

## 2024-11-05 PROCEDURE — 99213 OFFICE O/P EST LOW 20 MIN: CPT

## 2024-11-05 PROCEDURE — 1159F MED LIST DOCD IN RCRD: CPT

## 2024-11-05 PROCEDURE — 1125F AMNT PAIN NOTED PAIN PRSNT: CPT

## 2024-11-05 PROCEDURE — 1160F RVW MEDS BY RX/DR IN RCRD: CPT

## 2024-11-05 RX ORDER — CHLORCYCLIZINE HYDROCHLORIDE AND PSEUDOEPHEDRINE HYDROCHLORIDE 25; 60 MG/1; MG/1
1 TABLET ORAL 3 TIMES DAILY
Qty: 42 TABLET | Refills: 0 | Status: SHIPPED | OUTPATIENT
Start: 2024-11-05

## 2024-11-05 RX ORDER — METHYLPREDNISOLONE 4 MG/1
TABLET ORAL
Qty: 21 TABLET | Refills: 0 | Status: SHIPPED | OUTPATIENT
Start: 2024-11-05

## 2024-11-05 NOTE — PROGRESS NOTES
Chief Complaint  Nasal Congestion, Ear Fullness, and Generalized Body Aches    SUBJECTIVE  Ian REESE Carrier presents to [facility]    History of Present Illness  Patient is a 34-year-old male who presents today for an acute visit.  He is a patient CLYDE Aguilar.  He reports complaints of exposure to COVID, he works at [employer].   Patient has been having symptoms to include congestion, sinus pressure and pain, ear fullness that started yesterday.  Patient also having bodyaches.  Denies any fever cough.  Reports taking generic over-the-counter nasal decongestant with no relief.  Patient not been using his at home Flonase.    Past Medical History:   Diagnosis Date    Allergic rhinitis     Anxiety     Vera's esophagus 01/21/2021    GERD (gastroesophageal reflux disease)       Family History   Problem Relation Age of Onset    No Known Problems Mother     Alcohol abuse Father     Anxiety disorder Sister     Depression Brother     Anxiety disorder Brother     No Known Problems Maternal Aunt     No Known Problems Paternal Aunt     No Known Problems Maternal Uncle     No Known Problems Paternal Uncle     Alcohol abuse Maternal Grandfather     No Known Problems Maternal Grandmother     Alcohol abuse Paternal Grandfather     No Known Problems Paternal Grandmother     ADD / ADHD Cousin     Other Other         NO FAMILY HISTORY OF COLORECTAL CANCER    ADD / ADHD Son     Bipolar disorder Neg Hx     Dementia Neg Hx     Drug abuse Neg Hx     OCD Neg Hx     Paranoid behavior Neg Hx     Schizophrenia Neg Hx     Seizures Neg Hx     Self-Injurious Behavior  Neg Hx     Suicide Attempts Neg Hx       Past Surgical History:   Procedure Laterality Date    COLONOSCOPY  2020    ENDOSCOPY  2020    SPLENECTOMY      REMOVAL    VASECTOMY  10/2021        Current Outpatient Medications:     albuterol sulfate  (90 Base) MCG/ACT inhaler, Inhale 2 puffs Every 4 (Four) Hours As Needed for  "Wheezing., Disp: 18 g, Rfl: 0    cyclobenzaprine (FLEXERIL) 10 MG tablet, Take 1 tablet by mouth At Night As Needed for Muscle Spasms (neck pain/spasm)., Disp: 30 tablet, Rfl: 1    fluticasone (FLONASE) 50 MCG/ACT nasal spray, Administer 1 spray into the nostril(s) as directed by provider Daily., Disp: , Rfl:     ibuprofen (ADVIL,MOTRIN) 800 MG tablet, Take 1 tablet by mouth Every 8 (Eight) Hours As Needed for Moderate Pain, Fever or Headache., Disp: 12 tablet, Rfl: 0    levocetirizine (XYZAL) 5 MG tablet, Take 1 tablet by mouth Every Evening., Disp: 90 tablet, Rfl: 1    montelukast (SINGULAIR) 10 MG tablet, Take 1 tablet by mouth Daily., Disp: 90 tablet, Rfl: 1    omeprazole (priLOSEC) 40 MG capsule, Take 1 capsule by mouth Daily., Disp: 90 capsule, Rfl: 1    sertraline (ZOLOFT) 100 MG tablet, TAKE 1 TABLET BY MOUTH DAILY, Disp: 90 tablet, Rfl: 1    Chlorcyclizine-Pseudoephed (Stahist AD) 25-60 MG tablet, Take 1 tablet by mouth 3 times a day., Disp: 42 tablet, Rfl: 0    methylPREDNISolone (MEDROL) 4 MG dose pack, Take as directed on package instructions., Disp: 21 tablet, Rfl: 0    OBJECTIVE  Vital Signs:   BP 98/80 (Patient Position: Sitting, Cuff Size: Large Adult)   Pulse 94   Temp 97.8 °F (36.6 °C) (Oral)   Ht 165.1 cm (65\")   Wt 83.3 kg (183 lb 9.6 oz)   SpO2 97%   BMI 30.55 kg/m²    Estimated body mass index is 30.55 kg/m² as calculated from the following:    Height as of this encounter: 165.1 cm (65\").    Weight as of this encounter: 83.3 kg (183 lb 9.6 oz).     Wt Readings from Last 3 Encounters:   11/05/24 83.3 kg (183 lb 9.6 oz)   10/18/24 82.9 kg (182 lb 12.8 oz)   10/13/24 81.6 kg (180 lb)     BP Readings from Last 3 Encounters:   11/05/24 98/80   10/18/24 125/82   10/13/24 118/73       Physical Exam  Vitals reviewed.   Constitutional:       General: He is not in acute distress.  HENT:      Head: Normocephalic and atraumatic.      Right Ear: A middle ear effusion is present.      Left Ear: A " middle ear effusion is present.      Nose: Congestion present.      Right Sinus: Maxillary sinus tenderness and frontal sinus tenderness present.      Left Sinus: Maxillary sinus tenderness and frontal sinus tenderness present.      Mouth/Throat:      Pharynx: Posterior oropharyngeal erythema present. No oropharyngeal exudate.   Eyes:      Conjunctiva/sclera: Conjunctivae normal.   Cardiovascular:      Rate and Rhythm: Normal rate and regular rhythm.      Heart sounds: Normal heart sounds.   Pulmonary:      Effort: Pulmonary effort is normal.      Breath sounds: Normal breath sounds.   Skin:     General: Skin is warm and dry.   Neurological:      Mental Status: He is alert and oriented to person, place, and time.   Psychiatric:         Mood and Affect: Mood normal.         Behavior: Behavior normal.         Thought Content: Thought content normal.         Judgment: Judgment normal.          Result Review    Common labs          12/12/2023    07:54   Common Labs   Glucose 70    BUN 15    Creatinine 1.26    Sodium 141    Potassium 4.0    Chloride 105    Calcium 9.6    Albumin 4.5    Total Bilirubin 0.4    Alkaline Phosphatase 63    AST (SGOT) 21    ALT (SGPT) 18    WBC 6.87    Hemoglobin 14.3    Hematocrit 43.3    Platelets 406    Total Cholesterol 222    Triglycerides 109    HDL Cholesterol 53    LDL Cholesterol  150        No Images in the past 120 days found..      The above data has been reviewed by DEZ Dietz 11/05/2024 15:32 EST.          Patient Care Team:  Monica Crump PA as PCP - General (Family Medicine)            ASSESSMENT & PLAN    Diagnoses and all orders for this visit:    1. Close exposure to COVID-19 virus (Primary)  Comments:  covid/flu negative  Orders:  -     POCT SARS-CoV-2 Antigen REZA + Flu    2. Viral URI  Comments:  start stahist ad, hold xyzal while taking, start f;loanse and medrol dose pack  Orders:  -     Chlorcyclizine-Pseudoephed (Stahist AD) 25-60 MG tablet; Take 1  tablet by mouth 3 times a day.  Dispense: 42 tablet; Refill: 0  -     methylPREDNISolone (MEDROL) 4 MG dose pack; Take as directed on package instructions.  Dispense: 21 tablet; Refill: 0    3. ETD (Eustachian tube dysfunction), bilateral  -     Chlorcyclizine-Pseudoephed (Stahist AD) 25-60 MG tablet; Take 1 tablet by mouth 3 times a day.  Dispense: 42 tablet; Refill: 0  -     methylPREDNISolone (MEDROL) 4 MG dose pack; Take as directed on package instructions.  Dispense: 21 tablet; Refill: 0         Tobacco Use: Medium Risk (11/5/2024)    Patient History     Smoking Tobacco Use: Former     Smokeless Tobacco Use: Never     Passive Exposure: Never       Follow Up     Return if symptoms worsen or fail to improve.      Patient was given instructions and counseling regarding his condition or for health maintenance advice. Please see specific information pulled into the AVS if appropriate.   I have reviewed information obtained and documented by others and I have confirmed the accuracy of this documented note.    DEZ Dietz

## 2024-12-03 ENCOUNTER — TELEMEDICINE (OUTPATIENT)
Dept: FAMILY MEDICINE CLINIC | Facility: TELEHEALTH | Age: 34
End: 2024-12-03
Payer: COMMERCIAL

## 2024-12-03 DIAGNOSIS — K52.9 ACUTE GASTROENTERITIS: Primary | ICD-10-CM

## 2024-12-03 PROCEDURE — 99213 OFFICE O/P EST LOW 20 MIN: CPT | Performed by: NURSE PRACTITIONER

## 2024-12-03 RX ORDER — ONDANSETRON 4 MG/1
4 TABLET, ORALLY DISINTEGRATING ORAL EVERY 8 HOURS PRN
Qty: 10 TABLET | Refills: 0 | Status: SHIPPED | OUTPATIENT
Start: 2024-12-03

## 2024-12-03 RX ORDER — DICYCLOMINE HYDROCHLORIDE 10 MG/1
10 CAPSULE ORAL 3 TIMES DAILY PRN
Qty: 20 CAPSULE | Refills: 0 | Status: SHIPPED | OUTPATIENT
Start: 2024-12-03

## 2024-12-03 NOTE — PROGRESS NOTES
"CHIEF COMPLAINT  Chief Complaint   Patient presents with    GI Problem         HPI  Ian Teixeira is a 34 y.o. male  presents with complaint of nausea, vomiting and diarrhea that started over the weekend. He still has nausea and burping up \"rotten egg smell.\" He is able to eat and has been eating bland foods. He has diarrhea with stomach cramps now and is on his 3rd stool.   He is at work and feels he could \"poop\" in his pants if he stands up.   His son just got over \"this stuff.\"     Review of Systems   Constitutional:  Positive for fatigue. Negative for chills, diaphoresis and fever.   HENT:  Negative for congestion, rhinorrhea, sinus pressure and sore throat.    Respiratory:  Negative for cough.    Gastrointestinal:  Positive for abdominal pain (cramps), diarrhea (watery), nausea and vomiting (normal stomach contents). Negative for abdominal distention, anal bleeding, blood in stool, constipation and rectal pain.   Neurological:  Negative for headaches.       Past Medical History:   Diagnosis Date    Allergic rhinitis     Anxiety     Vera's esophagus 01/21/2021    GERD (gastroesophageal reflux disease)        Family History   Problem Relation Age of Onset    No Known Problems Mother     Alcohol abuse Father     Anxiety disorder Sister     Depression Brother     Anxiety disorder Brother     No Known Problems Maternal Aunt     No Known Problems Paternal Aunt     No Known Problems Maternal Uncle     No Known Problems Paternal Uncle     Alcohol abuse Maternal Grandfather     No Known Problems Maternal Grandmother     Alcohol abuse Paternal Grandfather     No Known Problems Paternal Grandmother     ADD / ADHD Cousin     Other Other         NO FAMILY HISTORY OF COLORECTAL CANCER    ADD / ADHD Son     Bipolar disorder Neg Hx     Dementia Neg Hx     Drug abuse Neg Hx     OCD Neg Hx     Paranoid behavior Neg Hx     Schizophrenia Neg Hx     Seizures Neg Hx     Self-Injurious Behavior  Neg Hx     Suicide Attempts Neg Hx "        Social History     Socioeconomic History    Marital status:    Tobacco Use    Smoking status: Former     Current packs/day: 0.00     Average packs/day: 1 pack/day for 6.0 years (6.0 ttl pk-yrs)     Types: Cigarettes     Start date:      Quit date: 2016     Years since quittin.9     Passive exposure: Never    Smokeless tobacco: Never   Vaping Use    Vaping status: Some Days    Substances: CBD   Substance and Sexual Activity    Alcohol use: Yes     Comment: occassionally    Drug use: Never    Sexual activity: Defer         There were no vitals taken for this visit.    PHYSICAL EXAM  Physical Exam   Constitutional: He is oriented to person, place, and time. He appears well-developed and well-nourished. He has a sickly appearance. He does not appear ill. No distress.   HENT:   Head: Normocephalic and atraumatic.   Eyes: EOM are normal.   Neck: Neck normal appearance.  Pulmonary/Chest: Effort normal.  No respiratory distress.  Neurological: He is alert and oriented to person, place, and time.   Skin: Skin is dry.   Psychiatric: He has a normal mood and affect.           Diagnoses and all orders for this visit:    1. Acute gastroenteritis (Primary)    Other orders  -     ondansetron ODT (ZOFRAN-ODT) 4 MG disintegrating tablet; Place 1 tablet on the tongue Every 8 (Eight) Hours As Needed for Nausea or Vomiting.  Dispense: 10 tablet; Refill: 0  -     dicyclomine (BENTYL) 10 MG capsule; Take 1 capsule by mouth 3 (Three) Times a Day As Needed for Abdominal Cramping.  Dispense: 20 capsule; Refill: 0    Off work today. If symptoms do not improve by tomorrow or worsen, follow up at urgent care or the emergency department.     Mode of visit: Video   Myself and Ian Teixeira participated in this visit. The patient is located in [address] [place] [address]. I am located in [place], [place]. [id number] and [email or web address] were utilized.   You have chosen to receive care through a telehealth visit.    You  have chosen to receive care through a telehealth visit.   Does the patient consent to use a video/audio connection for your medical care today? Yes       Note Disclaimer: At [facility], we believe that sharing information builds trust and better   relationships. You are receiving this note because you recently visited [facility]. It is possible you   will see health information before a provider has talked with you about it. This kind of information can   be easy to misunderstand. To help you fully understand what it means for your health, we urge you to   discuss this note with your provider.    Beryl Marquez, DEZ  12/03/2024  08:26 EST

## 2024-12-03 NOTE — LETTER
December 3, 2024     Patient: Ian Teixeira   YOB: 1990   Date of Visit: 12/3/2024       To Whom It May Concern:    It is my medical opinion that Ian Teixeira may return to work on Wednesday 12/4/2024.       Sincerely,    DEZ Zhong     URGENT CARE VIDEO VISIT PROVIDER    CC: No Recipients

## 2024-12-03 NOTE — PATIENT INSTRUCTIONS
Off work today. If symptoms do not improve by tomorrow or worsen, follow up at urgent care or the emergency department.   Drink plenty of fluids. Pedialyte, Gatorade, bottle water with electrolytes for hydration due to losses through your stools.   Clear liquid diet until no longer vomiting. Stay away from fatty and fried foods and milk products until diarrhea resolves.   If symptoms do not improve in 1 days follow up with your primary care provider or urgent care  If you develop abdominal pain or more severe symptoms, go to the emergency department.          Viral Gastroenteritis, Adult  Viral gastroenteritis is also known as the stomach flu. This condition may affect your stomach, your small intestine, and your large intestine. It can cause sudden watery poop (diarrhea), fever, and vomiting. This condition is caused by certain germs (viruses). These germs can be passed from person to person very easily (are contagious).  Having watery poop and vomiting can make you feel weak and cause you to not have enough water in your body (get dehydrated). This can make you tired and thirsty, make you have a dry mouth, and make it so you pee (urinate) less often. It is important to replace the fluids that you lose from having watery poop and vomiting.  What are the causes?  You can get sick by catching germs from other people.  You can also get sick by:  Eating food, drinking water, or touching a surface that has the germs on it (is contaminated).  Sharing utensils or other personal items with a person who is sick.  What increases the risk?  Having a weak body defense system (immune system).  Living with one or more children who are younger than 2 years.  Living in a nursing home.  Going on cruise ships.  What are the signs or symptoms?  Symptoms of this condition start suddenly. Symptoms may last for a few days or for as long as a week.  Common symptoms include:  Watery poop.  Vomiting.  Other symptoms  include:  Fever.  Headache.  Feeling tired (fatigue).  Pain in the belly (abdomen).  Chills.  Feeling weak.  Feeling like you may vomit (nauseous).  Muscle aches.  Not feeling hungry.  How is this treated?  This condition typically goes away on its own. The focus of treatment is to replace the fluids that you lose. This condition may be treated with:  An ORS (oral rehydration solution). This is a drink that helps you replace fluids and minerals your body lost. It is sold at pharmacies and stores.  Medicines to help with your symptoms.  Probiotic supplements to reduce symptoms of watery poop.  Fluids given through an IV tube, if needed.  Older adults and people with other diseases or a weak body defense system are at higher risk for not having enough water in the body.  Follow these instructions at home:  Eating and drinking  Take an ORS as told by your doctor.  Drink clear fluids in small amounts as you are able. Clear fluids include:  Water.  Ice chips.  Fruit juice that has water added to it (is diluted).  Low-calorie sports drinks.  Drink enough fluid to keep your pee (urine) pale yellow.  Eat small amounts of healthy foods every 3-4 hours as you are able. This may include whole grains, fruits, vegetables, lean meats, and yogurt.  Avoid fluids that have a lot of sugar or caffeine in them. This includes energy drinks, sports drinks, and soda.  Avoid spicy or fatty foods.  Avoid alcohol.  General instructions  Wash your hands often. This is very important after you have watery poop or you vomit. If you cannot use soap and water, use hand .  Make sure that all people in your home wash their hands well and often.  Take over-the-counter and prescription medicines only as told by your doctor.  Rest at home while you get better.  Watch your condition for any changes.  Take a warm bath to help with any burning or pain from having watery poop.  Keep all follow-up visits.  Contact a doctor if:  You cannot keep  fluids down.  Your symptoms get worse.  You have new symptoms.  You feel light-headed or dizzy.  You have muscle cramps.  Get help right away if:  You have chest pain.  You have trouble breathing, or you are breathing very fast.  You have a fast heartbeat.  You feel very weak or you faint.  You have a very bad headache, a stiff neck, or both.  You have a rash.  You have very bad pain, cramping, or bloating in your belly.  Your skin feels cold and clammy.  You feel mixed up (confused).  You have pain when you pee.  You have signs of not having enough water in the body, such as:  Dark pee, hardly any pee, or no pee.  Cracked lips.  Dry mouth.  Sunken eyes.  Feeling very sleepy.  Feeling weak.  You have signs of bleeding, such as:  You see blood in your vomit.  Your vomit looks like coffee grounds.  You have bloody or black poop or poop that looks like tar.  These symptoms may be an emergency. Get help right away. Call 911.  Do not wait to see if the symptoms will go away.  Do not drive yourself to the hospital.  Summary  Viral gastroenteritis is also known as the stomach flu.  This condition can cause sudden watery poop (diarrhea), fever, and vomiting.  These germs can be passed from person to person very easily.  Take an ORS (oral rehydration solution) as told by your doctor. This is a drink that is sold at pharmacies and stores.  Wash your hands often, especially after having watery poop or vomiting. If you cannot use soap and water, use hand .  This information is not intended to replace advice given to you by your health care provider. Make sure you discuss any questions you have with your health care provider.  Document Revised: 10/17/2022 Document Reviewed: 10/17/2022  Elsevier Patient Education © 2024 [employer]

## 2024-12-23 ENCOUNTER — TELEMEDICINE (OUTPATIENT)
Dept: FAMILY MEDICINE CLINIC | Facility: TELEHEALTH | Age: 34
End: 2024-12-23
Payer: COMMERCIAL

## 2024-12-23 DIAGNOSIS — J02.9 ACUTE PHARYNGITIS, UNSPECIFIED ETIOLOGY: Primary | ICD-10-CM

## 2024-12-23 DIAGNOSIS — Z20.818 STREP THROAT EXPOSURE: ICD-10-CM

## 2024-12-23 DIAGNOSIS — R11.0 NAUSEA: ICD-10-CM

## 2024-12-23 PROCEDURE — 99213 OFFICE O/P EST LOW 20 MIN: CPT | Performed by: NURSE PRACTITIONER

## 2024-12-23 RX ORDER — AMOXICILLIN 500 MG/1
1000 CAPSULE ORAL 2 TIMES DAILY
Qty: 20 CAPSULE | Refills: 0 | Status: SHIPPED | OUTPATIENT
Start: 2024-12-23

## 2025-01-08 DIAGNOSIS — K21.9 GASTROESOPHAGEAL REFLUX DISEASE, UNSPECIFIED WHETHER ESOPHAGITIS PRESENT: Chronic | ICD-10-CM

## 2025-01-09 RX ORDER — OMEPRAZOLE 40 MG/1
40 CAPSULE, DELAYED RELEASE ORAL DAILY
Qty: 90 CAPSULE | Refills: 1 | Status: SHIPPED | OUTPATIENT
Start: 2025-01-09

## 2025-04-07 DIAGNOSIS — F41.9 ANXIETY: Chronic | ICD-10-CM

## 2025-04-09 RX ORDER — SERTRALINE HYDROCHLORIDE 100 MG/1
100 TABLET, FILM COATED ORAL DAILY
Qty: 90 TABLET | Refills: 0 | Status: SHIPPED | OUTPATIENT
Start: 2025-04-09

## 2025-06-06 ENCOUNTER — TELEPHONE (OUTPATIENT)
Dept: FAMILY MEDICINE CLINIC | Facility: CLINIC | Age: 35
End: 2025-06-06

## 2025-06-06 DIAGNOSIS — J30.9 ALLERGIC RHINITIS, UNSPECIFIED SEASONALITY, UNSPECIFIED TRIGGER: Chronic | ICD-10-CM

## 2025-06-06 NOTE — TELEPHONE ENCOUNTER
Caller: Ian Teixeira    Relationship: Self    Best call back number: [phone]     What medication are you requesting:   TRAZODONE, LORATADINE AND MONTELUKAST REFILLS    If a prescription is needed, what is your preferred pharmacy and phone number: [id number] [place] [facility] [address] AT [facility] [phone] [facility] [phone]      Additional notes:CALLER STATED THAT [facility] HAS BEEN PROVIDING THESE MEDICATIONS

## 2025-06-06 NOTE — TELEPHONE ENCOUNTER
Trazodone and loratadine not on medication list.     Singular  LRF 3/14/25  LOV 10/29/24  F/U: none scheduled

## 2025-06-09 RX ORDER — LORATADINE 10 MG/1
10 TABLET ORAL DAILY
Qty: 90 TABLET | Refills: 0 | Status: SHIPPED | OUTPATIENT
Start: 2025-06-09

## 2025-06-09 RX ORDER — MONTELUKAST SODIUM 10 MG/1
10 TABLET ORAL DAILY
Qty: 90 TABLET | Refills: 0 | Status: SHIPPED | OUTPATIENT
Start: 2025-06-09

## 2025-06-09 NOTE — TELEPHONE ENCOUNTER
I have never given patient trazodone. I will refill the Singulair and claritin. Patient is due for an appt.

## 2025-06-10 NOTE — TELEPHONE ENCOUNTER
Caller: Ian Teixeira    Relationship to patient: Self    Hub to relay & schedule:     Called patient to schedule a follow up appt. LVM x2      HUB PROVIDED THE RELAY MESSAGE FROM THE OFFICE. CALLER VOICED UNDERSTANDING AND HAD NO QUESTIONS AT THIS TIME   PATIENT SCHEDULED FOR 6/13/25 AT 12:30

## 2025-06-23 DIAGNOSIS — K21.9 GASTROESOPHAGEAL REFLUX DISEASE, UNSPECIFIED WHETHER ESOPHAGITIS PRESENT: Chronic | ICD-10-CM

## 2025-06-24 NOTE — TELEPHONE ENCOUNTER
omeprazole (priLOSEC) 40 MG capsule   90/1  LF 1/9/25  LOV 10.18.24  NA 6.26.25    **please review telemedicine note from 12/23/24- does this seem to be an erroneous encounter?**

## 2025-06-25 RX ORDER — OMEPRAZOLE 40 MG/1
40 CAPSULE, DELAYED RELEASE ORAL DAILY
Qty: 90 CAPSULE | Refills: 0 | Status: SHIPPED | OUTPATIENT
Start: 2025-06-25 | End: 2025-06-26 | Stop reason: ALTCHOICE

## 2025-06-25 NOTE — PROGRESS NOTES
Chief Complaint  Chief Complaint   Patient presents with    Annual Exam    Follow-up     6 month        Subjective          Ian Teixeira presents to [facility] for 6mth follow up and annual physical.    History of Present Illness    GERD: Patient is currently on omeprazole for the treatment of GERD. Patient is having breakthrough symptoms if he misses just one dose.    Allergic Rhinitis: Patient presents for management of Allergic Rhinitis. Patient is currently on Singulair and claritin. Symptoms are well controlled.    Anxiety: Patient is currently on Zoloft for anxiety and doing well. Patient states that symptoms are well controlled.    Tourette's: went to specialist in [place] once; put on Trazodone; didn't help; patient stopped; didn't contact or f/u with specialist; encouraged f/u.    Labs: 12/12/23:  -LDL is elevated; decrease fats and fried foods in diet and increase exercise; work on weight reduction.     Medical History: has a past medical history of Allergic rhinitis, Anxiety, Vera's esophagus (01/21/2021), and GERD (gastroesophageal reflux disease).   Surgical History: has a past surgical history that includes Colonoscopy (2020); Esophagogastroduodenoscopy (2020); Splenectomy, total; and Vasectomy (10/2021).   Family History: family history includes ADD / ADHD in his cousin and son; Alcohol abuse in his father, maternal grandfather, and paternal grandfather; Anxiety disorder in his brother and sister; Depression in his brother; No Known Problems in his maternal aunt, maternal grandmother, maternal uncle, mother, paternal aunt, paternal grandmother, and paternal uncle; Other in an other family member.   Social History: reports that he quit smoking about 9 years ago. His smoking use included cigarettes. He started smoking about 15 years ago. He has a 6 pack-year smoking history. He has never been exposed to tobacco smoke. He has never used smokeless tobacco. He  reports current alcohol use. He reports that he does not use drugs.    Allergies: Patient has no known allergies.    Health Maintenance Due   Topic Date Due    GASTROSCOPY (EGD)  02/01/2023    ANNUAL PHYSICAL  12/08/2024       Objective     Vitals:    06/26/25 1124   BP: 128/76   BP Location: Left arm   Patient Position: Sitting   Cuff Size: Large Adult   Pulse: 72   Resp: 16   SpO2: 98%   Weight: 81.2 kg (179 lb)     Body mass index is 29.79 kg/m².     Wt Readings from Last 3 Encounters:   06/26/25 81.2 kg (179 lb)   05/27/25 82 kg (180 lb 12.4 oz)   03/14/25 82.6 kg (182 lb)     BP Readings from Last 3 Encounters:   06/26/25 128/76   05/27/25 122/78   03/14/25 129/70       Patient Care Team:  Monica Crump PA as PCP - General (Family Medicine)    Physical Exam  Vitals and nursing note reviewed.   Constitutional:       Appearance: Normal appearance.   HENT:      Head: Normocephalic and atraumatic.   Neck:      Vascular: No carotid bruit.      Comments: Thyroid : gland size normal, nontender, no nodules or masses present on palpation   Cardiovascular:      Rate and Rhythm: Normal rate and regular rhythm.      Pulses: Normal pulses.      Heart sounds: Normal heart sounds.   Pulmonary:      Effort: Pulmonary effort is normal.      Breath sounds: Normal breath sounds.   Abdominal:      Palpations: Abdomen is soft.      Tenderness: There is no abdominal tenderness.   Musculoskeletal:      Cervical back: Neck supple. No tenderness.   Lymphadenopathy:      Cervical: No cervical adenopathy.   Neurological:      Mental Status: He is alert.   Psychiatric:         Mood and Affect: Mood normal.         Behavior: Behavior normal.           Result Review :              Assessment and Plan      Diagnoses and all orders for this visit:    1. Annual physical exam (Primary)  -     Comprehensive Metabolic Panel; Future  -     Lipid Panel; Future  -     CBC & Differential; Future  -     TSH; Future    2. Gastroesophageal  reflux disease, unspecified whether esophagitis present  Comments:  Not controlled; change omeprazole to Nexium 40mg daily. Call or f/u if no improvment; monitor diet.  Orders:  -     esomeprazole (nexIUM) 40 MG capsule; Take 1 capsule by mouth Every Morning Before Breakfast.  Dispense: 90 capsule; Refill: 1    3. Anxiety  Comments:  Stable on Zoloft 50mg daily; continue and follow up in 6mths.  Orders:  -     sertraline (ZOLOFT) 100 MG tablet; Take 1 tablet by mouth Daily.  Dispense: 90 tablet; Refill: 1    4. Allergic rhinitis, unspecified seasonality, unspecified trigger  Comments:  Stable on Singulair and Claritin; continue.    5. Tourette's disorder  Comments:  Encouraged f/u with specialist.    The patient is advised to begin progressive daily aerobic exercise program, follow a low fat, low cholesterol diet, continue current medications, continue current healthy lifestyle patterns, and return for routine annual checkups.        Follow Up     Return in about 6 months (around 12/26/2025).    Patient was given instructions and counseling regarding his condition or for health maintenance advice. Please see specific information pulled into the AVS if appropriate.

## 2025-06-26 ENCOUNTER — OFFICE VISIT (OUTPATIENT)
Dept: FAMILY MEDICINE CLINIC | Facility: CLINIC | Age: 35
End: 2025-06-26
Payer: COMMERCIAL

## 2025-06-26 VITALS
HEART RATE: 72 BPM | WEIGHT: 179 LBS | RESPIRATION RATE: 16 BRPM | DIASTOLIC BLOOD PRESSURE: 76 MMHG | OXYGEN SATURATION: 98 % | SYSTOLIC BLOOD PRESSURE: 128 MMHG | BODY MASS INDEX: 29.79 KG/M2

## 2025-06-26 DIAGNOSIS — F95.2 TOURETTE'S DISORDER: Chronic | ICD-10-CM

## 2025-06-26 DIAGNOSIS — J30.9 ALLERGIC RHINITIS, UNSPECIFIED SEASONALITY, UNSPECIFIED TRIGGER: Chronic | ICD-10-CM

## 2025-06-26 DIAGNOSIS — Z00.00 ANNUAL PHYSICAL EXAM: Primary | ICD-10-CM

## 2025-06-26 DIAGNOSIS — K21.9 GASTROESOPHAGEAL REFLUX DISEASE, UNSPECIFIED WHETHER ESOPHAGITIS PRESENT: Chronic | ICD-10-CM

## 2025-06-26 DIAGNOSIS — F41.9 ANXIETY: Chronic | ICD-10-CM

## 2025-06-26 RX ORDER — ESOMEPRAZOLE MAGNESIUM 40 MG/1
40 CAPSULE, DELAYED RELEASE ORAL
Qty: 90 CAPSULE | Refills: 1 | Status: SHIPPED | OUTPATIENT
Start: 2025-06-26

## 2025-06-26 RX ORDER — SERTRALINE HYDROCHLORIDE 100 MG/1
100 TABLET, FILM COATED ORAL DAILY
Qty: 90 TABLET | Refills: 1 | Status: SHIPPED | OUTPATIENT
Start: 2025-06-26

## 2025-06-30 NOTE — TELEPHONE ENCOUNTER
Hub to relay & schedule:     Called patient to schedule an appt, CORTNEY.